# Patient Record
Sex: FEMALE | Race: BLACK OR AFRICAN AMERICAN | Employment: OTHER | URBAN - METROPOLITAN AREA
[De-identification: names, ages, dates, MRNs, and addresses within clinical notes are randomized per-mention and may not be internally consistent; named-entity substitution may affect disease eponyms.]

---

## 2017-02-10 ENCOUNTER — HOSPITAL ENCOUNTER (OUTPATIENT)
Dept: MAMMOGRAPHY | Age: 72
Discharge: HOME | End: 2017-02-10
Attending: INTERNAL MEDICINE | Admitting: INTERNAL MEDICINE

## 2017-02-10 DIAGNOSIS — Z12.31 VISIT FOR SCREENING MAMMOGRAM: ICD-10-CM

## 2017-03-15 ENCOUNTER — TELEPHONE (OUTPATIENT)
Dept: INTERNAL MEDICINE CLINIC | Age: 72
End: 2017-03-15

## 2017-03-16 ENCOUNTER — HOSPITAL ENCOUNTER (OUTPATIENT)
Dept: OTHER | Age: 72
Discharge: HOME | End: 2017-03-16
Attending: INTERNAL MEDICINE | Admitting: INTERNAL MEDICINE

## 2017-03-16 ENCOUNTER — OFFICE VISIT (OUTPATIENT)
Dept: INTERNAL MEDICINE CLINIC | Age: 72
End: 2017-03-16

## 2017-03-16 VITALS
SYSTOLIC BLOOD PRESSURE: 158 MMHG | BODY MASS INDEX: 40.02 KG/M2 | DIASTOLIC BLOOD PRESSURE: 92 MMHG | OXYGEN SATURATION: 12 % | WEIGHT: 255 LBS | HEIGHT: 67 IN

## 2017-03-16 DIAGNOSIS — J20.9 ACUTE BRONCHITIS, UNSPECIFIED ORGANISM: ICD-10-CM

## 2017-03-16 DIAGNOSIS — J45.901 RAD (REACTIVE AIRWAY DISEASE), UNSPECIFIED ASTHMA SEVERITY, WITH ACUTE EXACERBATION: ICD-10-CM

## 2017-03-16 DIAGNOSIS — J40 BRONCHITIS: ICD-10-CM

## 2017-03-16 DIAGNOSIS — J45.901 RAD (REACTIVE AIRWAY DISEASE), UNSPECIFIED ASTHMA SEVERITY, WITH ACUTE EXACERBATION: Primary | ICD-10-CM

## 2017-03-16 PROCEDURE — 3288F FALL RISK ASSESSMENT DOCD: CPT | Performed by: INTERNAL MEDICINE

## 2017-03-16 PROCEDURE — 99213 OFFICE O/P EST LOW 20 MIN: CPT | Performed by: INTERNAL MEDICINE

## 2017-03-16 RX ORDER — HYDROCODONE POLISTIREX AND CHLORPHENIRAMINE POLISTIREX 10; 8 MG/5ML; MG/5ML
5 SUSPENSION, EXTENDED RELEASE ORAL NIGHTLY PRN
Qty: 60 ML | Refills: 0 | Status: SHIPPED | OUTPATIENT
Start: 2017-03-16 | End: 2017-05-25 | Stop reason: ALTCHOICE

## 2017-03-16 RX ORDER — METHYLPREDNISOLONE 4 MG/1
TABLET ORAL
Qty: 1 KIT | Refills: 0 | Status: SHIPPED | OUTPATIENT
Start: 2017-03-16 | End: 2017-03-22

## 2017-03-16 RX ORDER — GUAIFENESIN 600 MG/1
600 TABLET, EXTENDED RELEASE ORAL 2 TIMES DAILY
Qty: 30 TABLET | Refills: 0 | Status: SHIPPED | OUTPATIENT
Start: 2017-03-16 | End: 2017-07-11 | Stop reason: ALTCHOICE

## 2017-03-16 RX ORDER — LEVOFLOXACIN 500 MG/1
500 TABLET, FILM COATED ORAL DAILY
Qty: 10 TABLET | Refills: 0 | Status: SHIPPED | OUTPATIENT
Start: 2017-03-16 | End: 2017-03-26

## 2017-03-16 ASSESSMENT — ENCOUNTER SYMPTOMS
SINUS PRESSURE: 1
CHEST TIGHTNESS: 0
RHINORRHEA: 1
HEMOPTYSIS: 0
NAUSEA: 0
VOICE CHANGE: 0
STRIDOR: 0
SHORTNESS OF BREATH: 0
ABDOMINAL PAIN: 0
EYE REDNESS: 0
WHEEZING: 1
VOMITING: 0
TROUBLE SWALLOWING: 0
COUGH: 1
SORE THROAT: 0

## 2017-03-20 RX ORDER — FAMOTIDINE 20 MG/1
TABLET, FILM COATED ORAL
Qty: 90 TABLET | Refills: 1 | Status: SHIPPED | OUTPATIENT
Start: 2017-03-20 | End: 2017-09-16 | Stop reason: SDUPTHER

## 2017-05-23 DIAGNOSIS — K21.9 GASTROESOPHAGEAL REFLUX DISEASE WITHOUT ESOPHAGITIS: Chronic | ICD-10-CM

## 2017-05-23 DIAGNOSIS — I10 ESSENTIAL HYPERTENSION, BENIGN: ICD-10-CM

## 2017-05-23 RX ORDER — OMEPRAZOLE 20 MG/1
CAPSULE, DELAYED RELEASE ORAL
Qty: 90 CAPSULE | Refills: 2 | Status: SHIPPED | OUTPATIENT
Start: 2017-05-23 | End: 2018-02-17 | Stop reason: SDUPTHER

## 2017-05-23 RX ORDER — ATORVASTATIN CALCIUM 20 MG/1
TABLET, FILM COATED ORAL
Qty: 90 TABLET | Refills: 2 | Status: SHIPPED | OUTPATIENT
Start: 2017-05-23 | End: 2018-02-17 | Stop reason: SDUPTHER

## 2017-05-23 RX ORDER — LOSARTAN POTASSIUM 50 MG/1
TABLET ORAL
Qty: 90 TABLET | Refills: 2 | Status: SHIPPED | OUTPATIENT
Start: 2017-05-23 | End: 2018-02-17 | Stop reason: SDUPTHER

## 2017-05-25 ENCOUNTER — OFFICE VISIT (OUTPATIENT)
Dept: INTERNAL MEDICINE CLINIC | Age: 72
End: 2017-05-25

## 2017-05-25 VITALS
DIASTOLIC BLOOD PRESSURE: 72 MMHG | SYSTOLIC BLOOD PRESSURE: 112 MMHG | WEIGHT: 253 LBS | BODY MASS INDEX: 39.63 KG/M2 | HEART RATE: 60 BPM

## 2017-05-25 DIAGNOSIS — F33.0 MILD EPISODE OF RECURRENT MAJOR DEPRESSIVE DISORDER (HCC): ICD-10-CM

## 2017-05-25 DIAGNOSIS — J45.20 EXTRINSIC ASTHMA, MILD INTERMITTENT, UNCOMPLICATED: ICD-10-CM

## 2017-05-25 DIAGNOSIS — I10 ESSENTIAL HYPERTENSION, BENIGN: Primary | ICD-10-CM

## 2017-05-25 DIAGNOSIS — K21.9 GASTROESOPHAGEAL REFLUX DISEASE WITHOUT ESOPHAGITIS: Chronic | ICD-10-CM

## 2017-05-25 PROCEDURE — 96372 THER/PROPH/DIAG INJ SC/IM: CPT | Performed by: INTERNAL MEDICINE

## 2017-05-25 PROCEDURE — 99213 OFFICE O/P EST LOW 20 MIN: CPT | Performed by: INTERNAL MEDICINE

## 2017-05-25 RX ORDER — METHYLPREDNISOLONE ACETATE 80 MG/ML
80 INJECTION, SUSPENSION INTRA-ARTICULAR; INTRALESIONAL; INTRAMUSCULAR; SOFT TISSUE ONCE
Qty: 1 ML | Refills: 0
Start: 2017-05-25 | End: 2017-05-25 | Stop reason: SDUPTHER

## 2017-05-25 RX ORDER — METHYLPREDNISOLONE ACETATE 80 MG/ML
80 INJECTION, SUSPENSION INTRA-ARTICULAR; INTRALESIONAL; INTRAMUSCULAR; SOFT TISSUE ONCE
Status: COMPLETED | OUTPATIENT
Start: 2017-05-25 | End: 2017-05-25

## 2017-05-25 RX ORDER — HYDROCODONE POLISTIREX AND CHLORPHENIRAMINE POLISTIREX 10; 8 MG/5ML; MG/5ML
5 SUSPENSION, EXTENDED RELEASE ORAL NIGHTLY PRN
Qty: 60 ML | Refills: 0 | Status: SHIPPED | OUTPATIENT
Start: 2017-05-25 | End: 2017-07-11 | Stop reason: ALTCHOICE

## 2017-05-25 RX ADMIN — METHYLPREDNISOLONE ACETATE 80 MG: 80 INJECTION, SUSPENSION INTRA-ARTICULAR; INTRALESIONAL; INTRAMUSCULAR; SOFT TISSUE at 11:16

## 2017-07-11 ENCOUNTER — OFFICE VISIT (OUTPATIENT)
Dept: INTERNAL MEDICINE CLINIC | Age: 72
End: 2017-07-11

## 2017-07-11 VITALS
SYSTOLIC BLOOD PRESSURE: 128 MMHG | HEIGHT: 67 IN | DIASTOLIC BLOOD PRESSURE: 80 MMHG | HEART RATE: 60 BPM | WEIGHT: 260.8 LBS | BODY MASS INDEX: 40.93 KG/M2

## 2017-07-11 DIAGNOSIS — I10 ESSENTIAL HYPERTENSION, BENIGN: ICD-10-CM

## 2017-07-11 DIAGNOSIS — Z01.818 PRE-OP EXAM: ICD-10-CM

## 2017-07-11 DIAGNOSIS — Z01.818 PRE-OP EXAM: Primary | ICD-10-CM

## 2017-07-11 LAB
HCT VFR BLD CALC: 41.7 % (ref 36–48)
HEMOGLOBIN: 13.6 G/DL (ref 12–16)
MCH RBC QN AUTO: 30.1 PG (ref 26–34)
MCHC RBC AUTO-ENTMCNC: 32.7 G/DL (ref 31–36)
MCV RBC AUTO: 92 FL (ref 80–100)
PDW BLD-RTO: 15.3 % (ref 12.4–15.4)
PLATELET # BLD: 289 K/UL (ref 135–450)
PMV BLD AUTO: 7.8 FL (ref 5–10.5)
RBC # BLD: 4.53 M/UL (ref 4–5.2)
WBC # BLD: 5.4 K/UL (ref 4–11)

## 2017-07-11 PROCEDURE — 93000 ELECTROCARDIOGRAM COMPLETE: CPT | Performed by: INTERNAL MEDICINE

## 2017-07-11 PROCEDURE — 99214 OFFICE O/P EST MOD 30 MIN: CPT | Performed by: INTERNAL MEDICINE

## 2017-07-12 LAB
ANION GAP SERPL CALCULATED.3IONS-SCNC: 12 MMOL/L (ref 3–16)
BUN BLDV-MCNC: 15 MG/DL (ref 7–20)
CALCIUM SERPL-MCNC: 9.8 MG/DL (ref 8.3–10.6)
CHLORIDE BLD-SCNC: 105 MMOL/L (ref 99–110)
CO2: 26 MMOL/L (ref 21–32)
CREAT SERPL-MCNC: 0.7 MG/DL (ref 0.6–1.2)
GFR AFRICAN AMERICAN: >60
GFR NON-AFRICAN AMERICAN: >60
GLUCOSE BLD-MCNC: 93 MG/DL (ref 70–99)
POTASSIUM SERPL-SCNC: 4.6 MMOL/L (ref 3.5–5.1)
SODIUM BLD-SCNC: 143 MMOL/L (ref 136–145)

## 2017-09-18 RX ORDER — FAMOTIDINE 20 MG/1
TABLET, FILM COATED ORAL
Qty: 90 TABLET | Refills: 1 | Status: SHIPPED | OUTPATIENT
Start: 2017-09-18 | End: 2018-03-17 | Stop reason: SDUPTHER

## 2017-11-30 ENCOUNTER — OFFICE VISIT (OUTPATIENT)
Dept: INTERNAL MEDICINE CLINIC | Age: 72
End: 2017-11-30

## 2017-11-30 VITALS
SYSTOLIC BLOOD PRESSURE: 138 MMHG | BODY MASS INDEX: 42.53 KG/M2 | WEIGHT: 271 LBS | HEART RATE: 64 BPM | DIASTOLIC BLOOD PRESSURE: 84 MMHG | HEIGHT: 67 IN

## 2017-11-30 DIAGNOSIS — F33.0 MILD EPISODE OF RECURRENT MAJOR DEPRESSIVE DISORDER (HCC): ICD-10-CM

## 2017-11-30 DIAGNOSIS — E78.2 MIXED HYPERLIPIDEMIA: Chronic | ICD-10-CM

## 2017-11-30 DIAGNOSIS — Z78.0 POST-MENOPAUSAL: ICD-10-CM

## 2017-11-30 DIAGNOSIS — E66.01 MORBID OBESITY (HCC): Chronic | ICD-10-CM

## 2017-11-30 DIAGNOSIS — I10 ESSENTIAL HYPERTENSION, BENIGN: Primary | ICD-10-CM

## 2017-11-30 DIAGNOSIS — Z23 NEED FOR INFLUENZA VACCINATION: ICD-10-CM

## 2017-11-30 DIAGNOSIS — J45.20 MILD INTERMITTENT EXTRINSIC ASTHMA WITHOUT COMPLICATION: ICD-10-CM

## 2017-11-30 PROCEDURE — G0008 ADMIN INFLUENZA VIRUS VAC: HCPCS | Performed by: INTERNAL MEDICINE

## 2017-11-30 PROCEDURE — 90662 IIV NO PRSV INCREASED AG IM: CPT | Performed by: INTERNAL MEDICINE

## 2017-11-30 PROCEDURE — 99214 OFFICE O/P EST MOD 30 MIN: CPT | Performed by: INTERNAL MEDICINE

## 2017-11-30 RX ORDER — MOMETASONE FUROATE 50 UG/1
2 SPRAY, METERED NASAL DAILY
Qty: 1 INHALER | Refills: 3 | Status: SHIPPED | OUTPATIENT
Start: 2017-11-30 | End: 2019-03-29 | Stop reason: SDUPTHER

## 2017-11-30 ASSESSMENT — ENCOUNTER SYMPTOMS: SHORTNESS OF BREATH: 0

## 2018-05-14 ENCOUNTER — OFFICE VISIT (OUTPATIENT)
Dept: INTERNAL MEDICINE CLINIC | Age: 73
End: 2018-05-14

## 2018-05-14 VITALS
WEIGHT: 268 LBS | SYSTOLIC BLOOD PRESSURE: 136 MMHG | BODY MASS INDEX: 41.97 KG/M2 | DIASTOLIC BLOOD PRESSURE: 86 MMHG | HEART RATE: 60 BPM

## 2018-05-14 DIAGNOSIS — E66.01 MORBID OBESITY (HCC): Chronic | ICD-10-CM

## 2018-05-14 DIAGNOSIS — F33.0 MILD EPISODE OF RECURRENT MAJOR DEPRESSIVE DISORDER (HCC): ICD-10-CM

## 2018-05-14 DIAGNOSIS — J01.00 ACUTE NON-RECURRENT MAXILLARY SINUSITIS: ICD-10-CM

## 2018-05-14 DIAGNOSIS — J45.20 MILD INTERMITTENT EXTRINSIC ASTHMA WITHOUT COMPLICATION: Primary | ICD-10-CM

## 2018-05-14 PROCEDURE — 99213 OFFICE O/P EST LOW 20 MIN: CPT | Performed by: INTERNAL MEDICINE

## 2018-05-14 RX ORDER — MONTELUKAST SODIUM 10 MG/1
10 TABLET ORAL DAILY
Qty: 30 TABLET | Refills: 3 | Status: SHIPPED | OUTPATIENT
Start: 2018-05-14 | End: 2018-07-02 | Stop reason: SDUPTHER

## 2018-05-14 RX ORDER — BENZONATATE 200 MG/1
200 CAPSULE ORAL 3 TIMES DAILY PRN
Qty: 21 CAPSULE | Refills: 0 | Status: SHIPPED | OUTPATIENT
Start: 2018-05-14 | End: 2018-05-21

## 2018-05-14 RX ORDER — PREDNISONE 20 MG/1
20 TABLET ORAL DAILY
Qty: 5 TABLET | Refills: 0 | Status: SHIPPED | OUTPATIENT
Start: 2018-05-14 | End: 2018-05-19

## 2018-05-14 RX ORDER — AZITHROMYCIN 250 MG/1
TABLET, FILM COATED ORAL
Qty: 1 PACKET | Refills: 0 | Status: SHIPPED | OUTPATIENT
Start: 2018-05-14 | End: 2018-07-02 | Stop reason: ALTCHOICE

## 2018-05-14 ASSESSMENT — ENCOUNTER SYMPTOMS
COUGH: 1
SHORTNESS OF BREATH: 1
WHEEZING: 1

## 2018-07-02 ENCOUNTER — OFFICE VISIT (OUTPATIENT)
Dept: INTERNAL MEDICINE CLINIC | Age: 73
End: 2018-07-02

## 2018-07-02 VITALS
HEART RATE: 64 BPM | DIASTOLIC BLOOD PRESSURE: 80 MMHG | BODY MASS INDEX: 42.13 KG/M2 | SYSTOLIC BLOOD PRESSURE: 132 MMHG | WEIGHT: 269 LBS

## 2018-07-02 DIAGNOSIS — F33.0 MILD EPISODE OF RECURRENT MAJOR DEPRESSIVE DISORDER (HCC): ICD-10-CM

## 2018-07-02 DIAGNOSIS — E66.01 MORBID OBESITY (HCC): Chronic | ICD-10-CM

## 2018-07-02 DIAGNOSIS — K21.9 GASTROESOPHAGEAL REFLUX DISEASE WITHOUT ESOPHAGITIS: Chronic | ICD-10-CM

## 2018-07-02 DIAGNOSIS — E78.2 MIXED HYPERLIPIDEMIA: Chronic | ICD-10-CM

## 2018-07-02 DIAGNOSIS — J45.20 MILD INTERMITTENT EXTRINSIC ASTHMA WITHOUT COMPLICATION: Primary | ICD-10-CM

## 2018-07-02 DIAGNOSIS — I10 ESSENTIAL HYPERTENSION, BENIGN: ICD-10-CM

## 2018-07-02 LAB
A/G RATIO: 1.7 (ref 1.1–2.2)
ALBUMIN SERPL-MCNC: 4.5 G/DL (ref 3.4–5)
ALP BLD-CCNC: 68 U/L (ref 40–129)
ALT SERPL-CCNC: 17 U/L (ref 10–40)
ANION GAP SERPL CALCULATED.3IONS-SCNC: 12 MMOL/L (ref 3–16)
AST SERPL-CCNC: 16 U/L (ref 15–37)
BASOPHILS ABSOLUTE: 0 K/UL (ref 0–0.2)
BASOPHILS RELATIVE PERCENT: 0.6 %
BILIRUB SERPL-MCNC: 0.9 MG/DL (ref 0–1)
BUN BLDV-MCNC: 20 MG/DL (ref 7–20)
CALCIUM SERPL-MCNC: 10 MG/DL (ref 8.3–10.6)
CHLORIDE BLD-SCNC: 102 MMOL/L (ref 99–110)
CHOLESTEROL, TOTAL: 178 MG/DL (ref 0–199)
CO2: 27 MMOL/L (ref 21–32)
CREAT SERPL-MCNC: 0.9 MG/DL (ref 0.6–1.2)
EOSINOPHILS ABSOLUTE: 0.1 K/UL (ref 0–0.6)
EOSINOPHILS RELATIVE PERCENT: 1.6 %
GFR AFRICAN AMERICAN: >60
GFR NON-AFRICAN AMERICAN: >60
GLOBULIN: 2.7 G/DL
GLUCOSE BLD-MCNC: 84 MG/DL (ref 70–99)
HCT VFR BLD CALC: 40.9 % (ref 36–48)
HDLC SERPL-MCNC: 100 MG/DL (ref 40–60)
HEMOGLOBIN: 13.8 G/DL (ref 12–16)
LDL CHOLESTEROL CALCULATED: 66 MG/DL
LYMPHOCYTES ABSOLUTE: 2.9 K/UL (ref 1–5.1)
LYMPHOCYTES RELATIVE PERCENT: 59.8 %
MCH RBC QN AUTO: 30.1 PG (ref 26–34)
MCHC RBC AUTO-ENTMCNC: 33.6 G/DL (ref 31–36)
MCV RBC AUTO: 89.4 FL (ref 80–100)
MONOCYTES ABSOLUTE: 0.4 K/UL (ref 0–1.3)
MONOCYTES RELATIVE PERCENT: 8.4 %
NEUTROPHILS ABSOLUTE: 1.4 K/UL (ref 1.7–7.7)
NEUTROPHILS RELATIVE PERCENT: 29.6 %
PDW BLD-RTO: 15.3 % (ref 12.4–15.4)
PLATELET # BLD: 300 K/UL (ref 135–450)
PMV BLD AUTO: 7.7 FL (ref 5–10.5)
POTASSIUM SERPL-SCNC: 3.9 MMOL/L (ref 3.5–5.1)
RBC # BLD: 4.58 M/UL (ref 4–5.2)
SODIUM BLD-SCNC: 141 MMOL/L (ref 136–145)
T4 FREE: 1.1 NG/DL (ref 0.9–1.8)
TOTAL PROTEIN: 7.2 G/DL (ref 6.4–8.2)
TRIGL SERPL-MCNC: 59 MG/DL (ref 0–150)
TSH SERPL DL<=0.05 MIU/L-ACNC: 2.92 UIU/ML (ref 0.27–4.2)
VLDLC SERPL CALC-MCNC: 12 MG/DL
WBC # BLD: 4.9 K/UL (ref 4–11)

## 2018-07-02 PROCEDURE — 99214 OFFICE O/P EST MOD 30 MIN: CPT | Performed by: INTERNAL MEDICINE

## 2018-07-02 PROCEDURE — G8510 SCR DEP NEG, NO PLAN REQD: HCPCS | Performed by: INTERNAL MEDICINE

## 2018-07-02 PROCEDURE — 3288F FALL RISK ASSESSMENT DOCD: CPT | Performed by: INTERNAL MEDICINE

## 2018-07-02 RX ORDER — MONTELUKAST SODIUM 10 MG/1
10 TABLET ORAL DAILY
Qty: 90 TABLET | Refills: 3 | Status: SHIPPED | OUTPATIENT
Start: 2018-07-02 | End: 2019-06-11 | Stop reason: SDUPTHER

## 2018-07-02 ASSESSMENT — PATIENT HEALTH QUESTIONNAIRE - PHQ9
SUM OF ALL RESPONSES TO PHQ QUESTIONS 1-9: 0
SUM OF ALL RESPONSES TO PHQ9 QUESTIONS 1 & 2: 0
2. FEELING DOWN, DEPRESSED OR HOPELESS: 0
1. LITTLE INTEREST OR PLEASURE IN DOING THINGS: 0

## 2018-07-02 NOTE — PROGRESS NOTES
reviewed. Current Outpatient Prescriptions:     escitalopram (LEXAPRO) 10 MG tablet, TAKE 1 TABLET DAILY, Disp: 90 tablet, Rfl: 1    montelukast (SINGULAIR) 10 MG tablet, Take 1 tablet by mouth daily, Disp: 30 tablet, Rfl: 3    triamterene-hydrochlorothiazide (MAXZIDE-25) 37.5-25 MG per tablet, TAKE 1 TABLET DAILY, Disp: 90 tablet, Rfl: 3    amLODIPine (NORVASC) 10 MG tablet, TAKE 1 TABLET DAILY, Disp: 90 tablet, Rfl: 3    famotidine (PEPCID) 20 MG tablet, TAKE 1 TABLET NIGHTLY AS NEEDED, Disp: 90 tablet, Rfl: 1    omeprazole (PRILOSEC) 20 MG delayed release capsule, TAKE 1 CAPSULE DAILY, Disp: 90 capsule, Rfl: 2    losartan (COZAAR) 50 MG tablet, TAKE 1 TABLET DAILY, Disp: 90 tablet, Rfl: 2    atorvastatin (LIPITOR) 20 MG tablet, TAKE 1 TABLET DAILY, Disp: 90 tablet, Rfl: 2    PROAIR  (90 Base) MCG/ACT inhaler, USE 2 INHALATIONS EVERY 6 HOURS AS NEEDED FOR WHEEZING, Disp: 25.5 g, Rfl: 3    mometasone (NASONEX) 50 MCG/ACT nasal spray, 2 sprays by Nasal route daily, Disp: 1 Inhaler, Rfl: 3    Cholecalciferol (VITAMIN D) 2000 UNITS CAPS capsule, Take by mouth, Disp: , Rfl:     dorzolamide (TRUSOPT) 2 % ophthalmic solution, Place 2 drops into both eyes 2 times daily, Disp: 10 mL, Rfl: 11    aspirin 325 MG tablet, Take 325 mg by mouth daily. , Disp: , Rfl:     B Complex-C-Folic Acid (VITABEE/C PO), Take 1 tablet by mouth.  , Disp: , Rfl:     FISH OIL, Take 1,000 mg by mouth daily. , Disp: , Rfl:     Calcium Carb-Cholecalciferol (CALCIUM 500 +D) 500-400 MG-UNIT TABS, Take 2 tablets by mouth daily. , Disp: , Rfl:     Assessment/Plan:  Mariella Espinoza was seen today for hypertension and hyperlipidemia. Diagnoses and all orders for this visit:    Mild intermittent extrinsic asthma without complication  -     montelukast (SINGULAIR) 10 MG tablet; Take 1 tablet by mouth daily    Essential hypertension, benign  -     CBC Auto Differential; Future  -     Lipid Panel;  Future  -     TSH without Reflex;

## 2018-07-03 ENCOUNTER — TELEPHONE (OUTPATIENT)
Dept: INTERNAL MEDICINE CLINIC | Age: 73
End: 2018-07-03

## 2018-07-03 NOTE — TELEPHONE ENCOUNTER
Pt calling to get her blood work  Results----having trouble getting on MyChart gave her the number for them but still would like a call back. Thanks.

## 2018-09-13 RX ORDER — FAMOTIDINE 20 MG/1
TABLET, FILM COATED ORAL
Qty: 90 TABLET | Refills: 1 | Status: SHIPPED | OUTPATIENT
Start: 2018-09-13 | End: 2019-03-12 | Stop reason: SDUPTHER

## 2018-11-14 DIAGNOSIS — K21.9 GASTROESOPHAGEAL REFLUX DISEASE WITHOUT ESOPHAGITIS: Chronic | ICD-10-CM

## 2018-11-14 DIAGNOSIS — I10 ESSENTIAL HYPERTENSION, BENIGN: ICD-10-CM

## 2018-11-14 RX ORDER — LOSARTAN POTASSIUM 50 MG/1
TABLET ORAL
Qty: 90 TABLET | Refills: 3 | Status: SHIPPED | OUTPATIENT
Start: 2018-11-14 | End: 2019-03-26

## 2018-11-14 RX ORDER — ATORVASTATIN CALCIUM 20 MG/1
TABLET, FILM COATED ORAL
Qty: 90 TABLET | Refills: 3 | Status: SHIPPED | OUTPATIENT
Start: 2018-11-14 | End: 2019-07-23 | Stop reason: SDUPTHER

## 2018-11-14 RX ORDER — OMEPRAZOLE 20 MG/1
CAPSULE, DELAYED RELEASE ORAL
Qty: 90 CAPSULE | Refills: 3 | Status: SHIPPED | OUTPATIENT
Start: 2018-11-14 | End: 2019-11-10 | Stop reason: SDUPTHER

## 2018-11-25 DIAGNOSIS — F33.0 MILD EPISODE OF RECURRENT MAJOR DEPRESSIVE DISORDER (HCC): ICD-10-CM

## 2018-11-26 RX ORDER — ESCITALOPRAM OXALATE 10 MG/1
TABLET ORAL
Qty: 90 TABLET | Refills: 1 | Status: SHIPPED | OUTPATIENT
Start: 2018-11-26 | End: 2019-05-28 | Stop reason: SDUPTHER

## 2018-12-05 ENCOUNTER — HOSPITAL ENCOUNTER (EMERGENCY)
Age: 73
Discharge: HOME | End: 2018-12-05
Attending: EMERGENCY MEDICINE
Payer: MEDICARE

## 2018-12-05 VITALS
SYSTOLIC BLOOD PRESSURE: 141 MMHG | TEMPERATURE: 98.5 F | OXYGEN SATURATION: 100 % | DIASTOLIC BLOOD PRESSURE: 65 MMHG | RESPIRATION RATE: 17 BRPM | HEART RATE: 56 BPM

## 2018-12-05 DIAGNOSIS — E86.0 DEHYDRATION: Primary | ICD-10-CM

## 2018-12-05 LAB
ANION GAP SERPL CALCULATED.3IONS-SCNC: 12 MMOL/L (ref 3–16)
BASOPHILS ABSOLUTE: 0 K/UL (ref 0–0.2)
BASOPHILS RELATIVE PERCENT: 0.6 %
BUN BLDV-MCNC: 24 MG/DL (ref 7–20)
CALCIUM SERPL-MCNC: 9.7 MG/DL (ref 8.3–10.6)
CHLORIDE BLD-SCNC: 99 MMOL/L (ref 99–110)
CO2: 25 MMOL/L (ref 21–32)
CREAT SERPL-MCNC: 1 MG/DL (ref 0.6–1.2)
EKG ATRIAL RATE: 50 BPM
EKG DIAGNOSIS: NORMAL
EKG P AXIS: 43 DEGREES
EKG P-R INTERVAL: 208 MS
EKG Q-T INTERVAL: 440 MS
EKG QRS DURATION: 104 MS
EKG QTC CALCULATION (BAZETT): 401 MS
EKG R AXIS: 53 DEGREES
EKG T AXIS: 10 DEGREES
EKG VENTRICULAR RATE: 50 BPM
EOSINOPHILS ABSOLUTE: 0.1 K/UL (ref 0–0.6)
EOSINOPHILS RELATIVE PERCENT: 0.9 %
GFR AFRICAN AMERICAN: >60
GFR NON-AFRICAN AMERICAN: 54
GLUCOSE BLD-MCNC: 93 MG/DL (ref 70–99)
HCT VFR BLD CALC: 39.6 % (ref 36–48)
HEMOGLOBIN: 12.8 G/DL (ref 12–16)
LYMPHOCYTES ABSOLUTE: 3.5 K/UL (ref 1–5.1)
LYMPHOCYTES RELATIVE PERCENT: 53.9 %
MAGNESIUM: 1.9 MG/DL (ref 1.8–2.4)
MCH RBC QN AUTO: 29.3 PG (ref 26–34)
MCHC RBC AUTO-ENTMCNC: 32.3 G/DL (ref 31–36)
MCV RBC AUTO: 90.9 FL (ref 80–100)
MONOCYTES ABSOLUTE: 0.5 K/UL (ref 0–1.3)
MONOCYTES RELATIVE PERCENT: 7.8 %
NEUTROPHILS ABSOLUTE: 2.4 K/UL (ref 1.7–7.7)
NEUTROPHILS RELATIVE PERCENT: 36.8 %
PDW BLD-RTO: 14.9 % (ref 12.4–15.4)
PLATELET # BLD: 282 K/UL (ref 135–450)
PMV BLD AUTO: 7.5 FL (ref 5–10.5)
POTASSIUM SERPL-SCNC: 4.3 MMOL/L (ref 3.5–5.1)
RBC # BLD: 4.36 M/UL (ref 4–5.2)
SODIUM BLD-SCNC: 136 MMOL/L (ref 136–145)
TROPONIN: <0.01 NG/ML
WBC # BLD: 6.5 K/UL (ref 4–11)

## 2018-12-05 PROCEDURE — 96360 HYDRATION IV INFUSION INIT: CPT

## 2018-12-05 PROCEDURE — 99284 EMERGENCY DEPT VISIT MOD MDM: CPT

## 2018-12-05 PROCEDURE — 93005 ELECTROCARDIOGRAM TRACING: CPT | Performed by: EMERGENCY MEDICINE

## 2018-12-05 PROCEDURE — 84484 ASSAY OF TROPONIN QUANT: CPT

## 2018-12-05 PROCEDURE — 2580000003 HC RX 258: Performed by: EMERGENCY MEDICINE

## 2018-12-05 PROCEDURE — 80048 BASIC METABOLIC PNL TOTAL CA: CPT

## 2018-12-05 PROCEDURE — 83735 ASSAY OF MAGNESIUM: CPT

## 2018-12-05 PROCEDURE — 85025 COMPLETE CBC W/AUTO DIFF WBC: CPT

## 2018-12-05 RX ORDER — 0.9 % SODIUM CHLORIDE 0.9 %
1000 INTRAVENOUS SOLUTION INTRAVENOUS ONCE
Status: COMPLETED | OUTPATIENT
Start: 2018-12-05 | End: 2018-12-05

## 2018-12-05 RX ADMIN — SODIUM CHLORIDE 1000 ML: 9 INJECTION, SOLUTION INTRAVENOUS at 18:59

## 2018-12-05 ASSESSMENT — PAIN SCALES - GENERAL: PAINLEVEL_OUTOF10: 0

## 2018-12-05 ASSESSMENT — ENCOUNTER SYMPTOMS
EYES NEGATIVE: 1
COLOR CHANGE: 0
VOMITING: 0
WHEEZING: 0
DIARRHEA: 0
ABDOMINAL PAIN: 0
CHEST TIGHTNESS: 0
ABDOMINAL DISTENTION: 0
SHORTNESS OF BREATH: 0
COUGH: 0
SINUS PRESSURE: 0
NAUSEA: 0

## 2018-12-05 NOTE — ED PROVIDER NOTES
the IV fluids and it was determined that she is able to be safely discharge to home at this time. This patient was also evaluated by the attending physician. All care plans were discussed and agreed upon. Clinical Impression     1.  Dehydration        Disposition     PATIENT REFERRED TO:  Ramiro Brambila MD  [address] [place] [address]  [phone]    Schedule an appointment as soon as possible for a visit   As needed      DISCHARGE MEDICATIONS:  New Prescriptions    No medications on file       [address] [facility], [facility]  Resident  12/05/18 [id number]

## 2019-03-26 ENCOUNTER — TELEPHONE (OUTPATIENT)
Dept: INTERNAL MEDICINE CLINIC | Age: 74
End: 2019-03-26

## 2019-03-26 RX ORDER — LISINOPRIL 10 MG/1
10 TABLET ORAL DAILY
Qty: 90 TABLET | Refills: 1 | Status: SHIPPED | OUTPATIENT
Start: 2019-03-26 | End: 2019-03-29 | Stop reason: SDUPTHER

## 2019-03-29 DIAGNOSIS — J45.20 MILD INTERMITTENT EXTRINSIC ASTHMA WITHOUT COMPLICATION: ICD-10-CM

## 2019-03-29 RX ORDER — MOMETASONE FUROATE 50 UG/1
2 SPRAY, METERED NASAL DAILY
Qty: 1 INHALER | Refills: 3 | Status: SHIPPED | OUTPATIENT
Start: 2019-03-29 | End: 2019-07-23 | Stop reason: SDUPTHER

## 2019-03-29 RX ORDER — LISINOPRIL 10 MG/1
10 TABLET ORAL DAILY
Qty: 7 TABLET | Refills: 0 | Status: SHIPPED | OUTPATIENT
Start: 2019-03-29 | End: 2019-07-23 | Stop reason: SDUPTHER

## 2019-04-05 ENCOUNTER — TELEPHONE (OUTPATIENT)
Dept: INTERNAL MEDICINE CLINIC | Age: 74
End: 2019-04-05

## 2019-04-11 ENCOUNTER — OFFICE VISIT (OUTPATIENT)
Dept: INTERNAL MEDICINE CLINIC | Age: 74
End: 2019-04-11
Payer: MEDICARE

## 2019-04-11 VITALS
DIASTOLIC BLOOD PRESSURE: 68 MMHG | SYSTOLIC BLOOD PRESSURE: 120 MMHG | WEIGHT: 265 LBS | TEMPERATURE: 98.1 F | BODY MASS INDEX: 41.5 KG/M2

## 2019-04-11 DIAGNOSIS — J30.2 SEASONAL ALLERGIES: Primary | ICD-10-CM

## 2019-04-11 PROCEDURE — 99213 OFFICE O/P EST LOW 20 MIN: CPT | Performed by: NURSE PRACTITIONER

## 2019-04-11 RX ORDER — CETIRIZINE HYDROCHLORIDE 10 MG/1
10 TABLET ORAL DAILY
Qty: 30 TABLET | Refills: 0 | Status: SHIPPED | OUTPATIENT
Start: 2019-04-11 | End: 2019-05-11

## 2019-04-11 ASSESSMENT — ENCOUNTER SYMPTOMS
ALLERGIC/IMMUNOLOGIC NEGATIVE: 1
COUGH: 1
EYES NEGATIVE: 1

## 2019-04-11 NOTE — PATIENT INSTRUCTIONS
Drink at least 5 bottles of water  Gargle with warm salt and water  Ricola lemon honeyor honey lime cough drops  Take allergy medicine at night

## 2019-04-11 NOTE — PROGRESS NOTES
Subjective:      Patient ID: Rosalina Rea is a 68 y.o. female. URI    This is a new problem. The current episode started 1 to 4 weeks ago. The problem has been gradually worsening. There has been no fever. Associated symptoms include coughing. She has tried increased fluids for the symptoms. The treatment provided mild relief. Review of Systems   Constitutional: Positive for fatigue. HENT: Negative. Eyes: Negative. Respiratory: Positive for cough. Endocrine: Negative. Genitourinary: Negative. Allergic/Immunologic: Negative. Neurological: Positive for dizziness. Hematological: Negative. Psychiatric/Behavioral: Negative. Vitals:    04/11/19 0842   BP: 120/68   Temp: 98.1 °F (36.7 °C)     BP Readings from Last 3 Encounters:   04/11/19 120/68   12/05/18 (!) 141/65   07/02/18 132/80     Wt Readings from Last 3 Encounters:   04/11/19 265 lb (120.2 kg)   07/02/18 269 lb (122 kg)   05/14/18 268 lb (121.6 kg)     Past Medical History:   Diagnosis Date    Asthma     Essential hypertension, benign 6/13/2011    Extrinsic asthma 6/13/2011    GERD (gastroesophageal reflux disease)     EGD DONE    Glaucoma (increased eye pressure) 2015    Hyperlipidemia     Mild episode of recurrent major depressive disorder ([facility] [address] 12/21/2016    Mixed hyperlipidemia 5/10/2016    Reactive airway disease      Family History   Problem Relation Age of Onset    Kidney Disease Mother     Diabetes Father     Coronary Art Dis Brother      Objective:   Physical Exam   Constitutional: She appears well-developed and well-nourished. HENT:   Head: Normocephalic. Right Ear: Hearing, tympanic membrane, external ear and ear canal normal.   Left Ear: Hearing, tympanic membrane, external ear and ear canal normal.   Nose: Nose normal. Right sinus exhibits no maxillary sinus tenderness and no frontal sinus tenderness. Left sinus exhibits no maxillary sinus tenderness and no frontal sinus tenderness. Mouth/Throat: Uvula is midline. Cardiovascular: Normal rate, regular rhythm and normal heart sounds. Pulmonary/Chest: Effort normal and breath sounds normal.   Lymphadenopathy:     She has no cervical adenopathy. Right cervical: No superficial cervical, no deep cervical and no posterior cervical adenopathy present. Left cervical: No superficial cervical, no deep cervical and no posterior cervical adenopathy present. Skin: Skin is warm and dry.        Assessment:      Seasonal allergies      Plan:     Drink at least 5 bottles of water  Gargle with warm salt and water  Ricola lemon honeyor honey lime cough drops  Take allergy medicine at night          Sanford Aguirre, APRN - CNP

## 2019-04-16 ENCOUNTER — TELEPHONE (OUTPATIENT)
Dept: INTERNAL MEDICINE CLINIC | Age: 74
End: 2019-04-16

## 2019-04-16 RX ORDER — BENZONATATE 200 MG/1
200 CAPSULE ORAL 3 TIMES DAILY PRN
Qty: 30 CAPSULE | Refills: 0 | Status: SHIPPED | OUTPATIENT
Start: 2019-04-16 | End: 2019-04-23

## 2019-04-16 NOTE — TELEPHONE ENCOUNTER
Patient called and stated that it is going on 4 weeks that she has had this cough and would like a prescription.      Patient Phone   [phone]

## 2019-05-03 ENCOUNTER — OFFICE VISIT (OUTPATIENT)
Dept: INTERNAL MEDICINE CLINIC | Age: 74
End: 2019-05-03
Payer: MEDICARE

## 2019-05-03 VITALS
HEART RATE: 64 BPM | DIASTOLIC BLOOD PRESSURE: 80 MMHG | BODY MASS INDEX: 42.06 KG/M2 | WEIGHT: 268 LBS | SYSTOLIC BLOOD PRESSURE: 126 MMHG | HEIGHT: 67 IN

## 2019-05-03 DIAGNOSIS — J20.9 ACUTE BRONCHITIS, UNSPECIFIED ORGANISM: Primary | ICD-10-CM

## 2019-05-03 DIAGNOSIS — J00 ACUTE RHINITIS: ICD-10-CM

## 2019-05-03 DIAGNOSIS — E66.01 MORBID OBESITY (HCC): Chronic | ICD-10-CM

## 2019-05-03 PROBLEM — J31.0 RHINITIS: Status: ACTIVE | Noted: 2019-05-03

## 2019-05-03 PROCEDURE — 99213 OFFICE O/P EST LOW 20 MIN: CPT | Performed by: INTERNAL MEDICINE

## 2019-05-03 PROCEDURE — 94640 AIRWAY INHALATION TREATMENT: CPT | Performed by: INTERNAL MEDICINE

## 2019-05-03 RX ORDER — ALBUTEROL SULFATE 2.5 MG/3ML
2.5 SOLUTION RESPIRATORY (INHALATION) ONCE
Qty: 25 VIAL | Refills: 2
Start: 2019-05-03 | End: 2019-07-23 | Stop reason: SDUPTHER

## 2019-05-03 RX ORDER — BROMPHENIRAMINE MALEATE, PSEUDOEPHEDRINE HYDROCHLORIDE, AND DEXTROMETHORPHAN HYDROBROMIDE 2; 30; 10 MG/5ML; MG/5ML; MG/5ML
5 SYRUP ORAL 4 TIMES DAILY PRN
Qty: 473 ML | Refills: 0 | COMMUNITY
Start: 2019-05-03 | End: 2019-05-17 | Stop reason: ALTCHOICE

## 2019-05-03 RX ORDER — ALBUTEROL SULFATE 2.5 MG/3ML
2.5 SOLUTION RESPIRATORY (INHALATION) ONCE
Status: COMPLETED | OUTPATIENT
Start: 2019-05-03 | End: 2019-05-03

## 2019-05-03 RX ORDER — PREDNISONE 10 MG/1
TABLET ORAL
Qty: 30 TABLET | Refills: 0 | Status: SHIPPED | OUTPATIENT
Start: 2019-05-03 | End: 2019-05-17 | Stop reason: ALTCHOICE

## 2019-05-03 RX ORDER — AZITHROMYCIN 250 MG/1
TABLET, FILM COATED ORAL
Qty: 1 PACKET | Refills: 0 | Status: SHIPPED | OUTPATIENT
Start: 2019-05-03 | End: 2019-05-17 | Stop reason: ALTCHOICE

## 2019-05-03 RX ADMIN — ALBUTEROL SULFATE 2.5 MG: 2.5 SOLUTION RESPIRATORY (INHALATION) at 11:05

## 2019-05-03 NOTE — PROGRESS NOTES
SUBJECTIVE:    Patient comes to the office complaining of cough, congestion, drainage that has been present for the last several days. Her coughis productive of yellow sputum. Patient does not have associated fever or chills. Patient denies gastrointestinal symptoms related to Her present condition. OBJECTIVE:  Vitals:    05/03/19 1027   BP: 126/80   Pulse: 64       Review of Systems    Physical Exam   Constitutional: She is oriented to person, place, and time. She appears well-developed and well-nourished. No distress. HENT:   Head: Normocephalic and atraumatic. Pulmonary/Chest: Effort normal. Stridor present. She has wheezes. She has rales. She exhibits no tenderness. Neurological: She is alert and oriented to person, place, and time. No cranial nerve deficit. Skin: She is not diaphoretic. Psychiatric: She has a normal mood and affect. Her behavior is normal. Judgment and thought content normal.   Vitals reviewed.     Vitals:    05/03/19 1027   BP: 126/80   Pulse: 64         Current Outpatient Medications:     amLODIPine (NORVASC) 10 MG tablet, TAKE 1 TABLET DAILY, Disp: 90 tablet, Rfl: 0    triamterene-hydrochlorothiazide (MAXZIDE-25) 37.5-25 MG per tablet, TAKE 1 TABLET DAILY, Disp: 90 tablet, Rfl: 0    cetirizine (ZYRTEC) 10 MG tablet, Take 1 tablet by mouth daily, Disp: 30 tablet, Rfl: 0    mometasone (NASONEX) 50 MCG/ACT nasal spray, 2 sprays by Nasal route daily, Disp: 1 Inhaler, Rfl: 3    lisinopril (PRINIVIL;ZESTRIL) 10 MG tablet, Take 1 tablet by mouth daily, Disp: 7 tablet, Rfl: 0    famotidine (PEPCID) 20 MG tablet, TAKE 1 TABLET NIGHTLY AS NEEDED, Disp: 90 tablet, Rfl: 0    PROAIR  (90 Base) MCG/ACT inhaler, USE 2 INHALATIONS EVERY 6 HOURS AS NEEDED FOR WHEEZING, Disp: 25.5 g, Rfl: 1    escitalopram (LEXAPRO) 10 MG tablet, TAKE 1 TABLET DAILY, Disp: 90 tablet, Rfl: 1    omeprazole (PRILOSEC) 20 MG delayed release capsule, TAKE 1 CAPSULE DAILY, Disp: 90 capsule, Rfl: 3    atorvastatin (LIPITOR) 20 MG tablet, TAKE 1 TABLET DAILY, Disp: 90 tablet, Rfl: 3    montelukast (SINGULAIR) 10 MG tablet, Take 1 tablet by mouth daily, Disp: 90 tablet, Rfl: 3    Cholecalciferol (VITAMIN D) 2000 UNITS CAPS capsule, Take by mouth, Disp: , Rfl:     dorzolamide (TRUSOPT) 2 % ophthalmic solution, Place 2 drops into both eyes 2 times daily, Disp: 10 mL, Rfl: 11    aspirin 325 MG tablet, Take 325 mg by mouth daily. , Disp: , Rfl:     B Complex-C-Folic Acid (VITABEE/C PO), Take 1 tablet by mouth.  , Disp: , Rfl:     FISH OIL, Take 1,000 mg by mouth daily. , Disp: , Rfl:     Calcium Carb-Cholecalciferol (CALCIUM 500 +D) 500-400 MG-UNIT TABS, Take 2 tablets by mouth daily. , Disp: , Rfl:     Assessment/Plan:  Sophie Pabon was seen today for cough, chest congestion, shortness of breath and wheezing. Diagnoses and all orders for this visit:    Acute bronchitis, unspecified organism  -     albuterol (PROVENTIL) (2.5 MG/3ML) 0.083% nebulizer solution; Take 3 mLs by nebulization once for 1 dose  -     predniSONE (DELTASONE) 10 MG tablet; Take 4 tablets daily for 3 days, then 3 tablets daily for 3 days, then 2 tablets daily for 3 days, then 1 tablet daily for 3 days. -     azithromycin (ZITHROMAX Z-LIAN) 250 MG tablet; Use as directred  -     brompheniramine-pseudoephedrine-DM (BROMFED DM) 2-30-10 MG/5ML syrup; Take 5 mLs by mouth 4 times daily as needed for Congestion or Cough    Acute rhinitis  -     azithromycin (ZITHROMAX Z-LIAN) 250 MG tablet; Use as directred    Morbid obesity ([address]  Comments:  Chronic, stable.     Other orders  -     albuterol (PROVENTIL) nebulizer solution 2.5 mg      Thelma Browne MD

## 2019-05-17 ENCOUNTER — OFFICE VISIT (OUTPATIENT)
Dept: INTERNAL MEDICINE CLINIC | Age: 74
End: 2019-05-17
Payer: MEDICARE

## 2019-05-17 VITALS
BODY MASS INDEX: 41.75 KG/M2 | HEIGHT: 67 IN | WEIGHT: 266 LBS | DIASTOLIC BLOOD PRESSURE: 68 MMHG | SYSTOLIC BLOOD PRESSURE: 112 MMHG | HEART RATE: 60 BPM

## 2019-05-17 DIAGNOSIS — I10 ESSENTIAL HYPERTENSION, BENIGN: ICD-10-CM

## 2019-05-17 DIAGNOSIS — E66.01 MORBID OBESITY (HCC): Primary | Chronic | ICD-10-CM

## 2019-05-17 DIAGNOSIS — E66.01 MORBID OBESITY (HCC): Chronic | ICD-10-CM

## 2019-05-17 DIAGNOSIS — Z13.1 DIABETES MELLITUS SCREENING: ICD-10-CM

## 2019-05-17 DIAGNOSIS — Z12.39 BREAST CANCER SCREENING: ICD-10-CM

## 2019-05-17 LAB
A/G RATIO: 1.4 (ref 1.1–2.2)
ALBUMIN SERPL-MCNC: 3.9 G/DL (ref 3.4–5)
ALP BLD-CCNC: 71 U/L (ref 40–129)
ALT SERPL-CCNC: 13 U/L (ref 10–40)
ANION GAP SERPL CALCULATED.3IONS-SCNC: 13 MMOL/L (ref 3–16)
AST SERPL-CCNC: 11 U/L (ref 15–37)
BASOPHILS ABSOLUTE: 0.1 K/UL (ref 0–0.2)
BASOPHILS RELATIVE PERCENT: 0.7 %
BILIRUB SERPL-MCNC: 1 MG/DL (ref 0–1)
BUN BLDV-MCNC: 22 MG/DL (ref 7–20)
CALCIUM SERPL-MCNC: 10 MG/DL (ref 8.3–10.6)
CHLORIDE BLD-SCNC: 99 MMOL/L (ref 99–110)
CHOLESTEROL, TOTAL: 150 MG/DL (ref 0–199)
CO2: 26 MMOL/L (ref 21–32)
CREAT SERPL-MCNC: 0.9 MG/DL (ref 0.6–1.2)
EOSINOPHILS ABSOLUTE: 0.1 K/UL (ref 0–0.6)
EOSINOPHILS RELATIVE PERCENT: 1.2 %
GFR AFRICAN AMERICAN: >60
GFR NON-AFRICAN AMERICAN: >60
GLOBULIN: 2.7 G/DL
GLUCOSE BLD-MCNC: 96 MG/DL (ref 70–99)
HCT VFR BLD CALC: 40.5 % (ref 36–48)
HDLC SERPL-MCNC: 79 MG/DL (ref 40–60)
HEMOGLOBIN: 13.1 G/DL (ref 12–16)
LDL CHOLESTEROL CALCULATED: 58 MG/DL
LYMPHOCYTES ABSOLUTE: 4.1 K/UL (ref 1–5.1)
LYMPHOCYTES RELATIVE PERCENT: 58 %
MCH RBC QN AUTO: 29 PG (ref 26–34)
MCHC RBC AUTO-ENTMCNC: 32.3 G/DL (ref 31–36)
MCV RBC AUTO: 89.8 FL (ref 80–100)
MONOCYTES ABSOLUTE: 0.6 K/UL (ref 0–1.3)
MONOCYTES RELATIVE PERCENT: 7.9 %
NEUTROPHILS ABSOLUTE: 2.3 K/UL (ref 1.7–7.7)
NEUTROPHILS RELATIVE PERCENT: 32.2 %
PDW BLD-RTO: 15.1 % (ref 12.4–15.4)
PLATELET # BLD: 324 K/UL (ref 135–450)
PMV BLD AUTO: 7.8 FL (ref 5–10.5)
POTASSIUM SERPL-SCNC: 4.1 MMOL/L (ref 3.5–5.1)
RBC # BLD: 4.51 M/UL (ref 4–5.2)
SODIUM BLD-SCNC: 138 MMOL/L (ref 136–145)
T4 FREE: 1.2 NG/DL (ref 0.9–1.8)
TOTAL PROTEIN: 6.6 G/DL (ref 6.4–8.2)
TRIGL SERPL-MCNC: 64 MG/DL (ref 0–150)
TSH SERPL DL<=0.05 MIU/L-ACNC: 4.34 UIU/ML (ref 0.27–4.2)
VLDLC SERPL CALC-MCNC: 13 MG/DL
WBC # BLD: 7.1 K/UL (ref 4–11)

## 2019-05-17 PROCEDURE — 99214 OFFICE O/P EST MOD 30 MIN: CPT | Performed by: INTERNAL MEDICINE

## 2019-05-17 ASSESSMENT — ENCOUNTER SYMPTOMS: SHORTNESS OF BREATH: 0

## 2019-05-17 NOTE — PROGRESS NOTES
Patricia Celis comes for depression. She does not take medication for Her condition. She is not suicidal or homicidal.  The depressionis not associated with excessive sleeping, anhedonia or anxiety. Patient does not have bipolar disease. Patricia Celis returns for follow up of hypertension. Patient has been taking Her medications as prescribed. Patient's blood pressure is  controlled. Side effects related to taking the medications include no medication side effects noted. Review of Systems   Respiratory: Negative for shortness of breath. Cardiovascular: Negative for chest pain. Vitals:    05/17/19 0852   BP: 112/68   Pulse: 60       Physical Exam   Constitutional: She is oriented to person, place, and time. She appears well-developed and well-nourished. No distress. HENT:   Head: Normocephalic and atraumatic. Pulmonary/Chest: Effort normal.   Neurological: She is alert and oriented to person, place, and time. No cranial nerve deficit. Skin: She is not diaphoretic. Psychiatric: She has a normal mood and affect. Her behavior is normal. Judgment and thought content normal.   Vitals reviewed.         Current Outpatient Medications:     amLODIPine (NORVASC) 10 MG tablet, TAKE 1 TABLET DAILY, Disp: 90 tablet, Rfl: 0    triamterene-hydrochlorothiazide (MAXZIDE-25) 37.5-25 MG per tablet, TAKE 1 TABLET DAILY, Disp: 90 tablet, Rfl: 0    mometasone (NASONEX) 50 MCG/ACT nasal spray, 2 sprays by Nasal route daily, Disp: 1 Inhaler, Rfl: 3    lisinopril (PRINIVIL;ZESTRIL) 10 MG tablet, Take 1 tablet by mouth daily, Disp: 7 tablet, Rfl: 0    famotidine (PEPCID) 20 MG tablet, TAKE 1 TABLET NIGHTLY AS NEEDED, Disp: 90 tablet, Rfl: 0    PROAIR  (90 Base) MCG/ACT inhaler, USE 2 INHALATIONS EVERY 6 HOURS AS NEEDED FOR WHEEZING, Disp: 25.5 g, Rfl: 1    escitalopram (LEXAPRO) 10 MG tablet, TAKE 1 TABLET DAILY, Disp: 90 tablet, Rfl: 1    omeprazole (PRILOSEC) 20 MG delayed release capsule, TAKE

## 2019-05-18 LAB
ESTIMATED AVERAGE GLUCOSE: 125.5 MG/DL
HBA1C MFR BLD: 6 %

## 2019-07-23 ENCOUNTER — OFFICE VISIT (OUTPATIENT)
Dept: INTERNAL MEDICINE CLINIC | Age: 74
End: 2019-07-23
Payer: MEDICARE

## 2019-07-23 VITALS
RESPIRATION RATE: 16 BRPM | BODY MASS INDEX: 41.78 KG/M2 | HEIGHT: 67 IN | DIASTOLIC BLOOD PRESSURE: 78 MMHG | SYSTOLIC BLOOD PRESSURE: 136 MMHG | TEMPERATURE: 98.1 F | OXYGEN SATURATION: 98 % | HEART RATE: 74 BPM | WEIGHT: 266.2 LBS

## 2019-07-23 DIAGNOSIS — K64.4 RESIDUAL HEMORRHOIDAL SKIN TAGS: ICD-10-CM

## 2019-07-23 DIAGNOSIS — Z12.31 ENCOUNTER FOR SCREENING MAMMOGRAM FOR BREAST CANCER: ICD-10-CM

## 2019-07-23 DIAGNOSIS — R31.1 BENIGN ESSENTIAL MICROSCOPIC HEMATURIA: ICD-10-CM

## 2019-07-23 DIAGNOSIS — Z23 NEED FOR PROPHYLACTIC VACCINATION AND INOCULATION AGAINST VARICELLA: ICD-10-CM

## 2019-07-23 DIAGNOSIS — I10 ESSENTIAL HYPERTENSION, BENIGN: ICD-10-CM

## 2019-07-23 DIAGNOSIS — J45.20 MILD INTERMITTENT EXTRINSIC ASTHMA WITHOUT COMPLICATION: ICD-10-CM

## 2019-07-23 DIAGNOSIS — R21 RASH AND NONSPECIFIC SKIN ERUPTION: ICD-10-CM

## 2019-07-23 DIAGNOSIS — J20.9 ACUTE BRONCHITIS, UNSPECIFIED ORGANISM: ICD-10-CM

## 2019-07-23 DIAGNOSIS — R21 RASH AND NONSPECIFIC SKIN ERUPTION: Primary | ICD-10-CM

## 2019-07-23 DIAGNOSIS — F33.0 MILD EPISODE OF RECURRENT MAJOR DEPRESSIVE DISORDER (HCC): ICD-10-CM

## 2019-07-23 DIAGNOSIS — R31.9 URINARY TRACT INFECTION WITH HEMATURIA, SITE UNSPECIFIED: ICD-10-CM

## 2019-07-23 DIAGNOSIS — N39.0 URINARY TRACT INFECTION WITH HEMATURIA, SITE UNSPECIFIED: ICD-10-CM

## 2019-07-23 DIAGNOSIS — Z78.0 POST-MENOPAUSAL: ICD-10-CM

## 2019-07-23 LAB
BILIRUBIN, POC: NORMAL
BLOOD URINE, POC: NORMAL
CLARITY, POC: CLEAR
COLOR, POC: YELLOW
GLUCOSE URINE, POC: NORMAL
KETONES, POC: NORMAL
LEUKOCYTE EST, POC: NORMAL
NITRITE, POC: NORMAL
PH, POC: 7.5
PROTEIN, POC: NORMAL
SPECIFIC GRAVITY, POC: 1.02
UROBILINOGEN, POC: 0.02

## 2019-07-23 PROCEDURE — 81002 URINALYSIS NONAUTO W/O SCOPE: CPT | Performed by: INTERNAL MEDICINE

## 2019-07-23 PROCEDURE — 99214 OFFICE O/P EST MOD 30 MIN: CPT | Performed by: INTERNAL MEDICINE

## 2019-07-23 RX ORDER — SULFAMETHOXAZOLE AND TRIMETHOPRIM 800; 160 MG/1; MG/1
1 TABLET ORAL 2 TIMES DAILY
Qty: 20 TABLET | Refills: 0 | Status: SHIPPED | OUTPATIENT
Start: 2019-07-23 | End: 2019-08-02

## 2019-07-23 RX ORDER — MOMETASONE FUROATE 50 UG/1
2 SPRAY, METERED NASAL DAILY
Qty: 1 INHALER | Refills: 3 | Status: SHIPPED | OUTPATIENT
Start: 2019-07-23 | End: 2020-03-30

## 2019-07-23 RX ORDER — ACYCLOVIR 50 MG/G
OINTMENT TOPICAL
Qty: 30 G | Refills: 1 | Status: SHIPPED | OUTPATIENT
Start: 2019-07-23 | End: 2019-07-30

## 2019-07-23 RX ORDER — HYDROCORTISONE ACETATE 25 MG/1
25 SUPPOSITORY RECTAL EVERY 12 HOURS
Qty: 24 SUPPOSITORY | Refills: 2 | Status: SHIPPED | OUTPATIENT
Start: 2019-07-23 | End: 2022-09-02 | Stop reason: ALTCHOICE

## 2019-07-23 RX ORDER — ATORVASTATIN CALCIUM 20 MG/1
TABLET, FILM COATED ORAL
Qty: 90 TABLET | Refills: 3 | Status: SHIPPED | OUTPATIENT
Start: 2019-07-23 | End: 2020-07-21

## 2019-07-23 RX ORDER — LISINOPRIL 10 MG/1
10 TABLET ORAL DAILY
Qty: 7 TABLET | Refills: 0 | Status: SHIPPED | OUTPATIENT
Start: 2019-07-23 | End: 2019-08-30

## 2019-07-23 RX ORDER — BUDESONIDE AND FORMOTEROL FUMARATE DIHYDRATE 160; 4.5 UG/1; UG/1
2 AEROSOL RESPIRATORY (INHALATION) 2 TIMES DAILY
COMMUNITY

## 2019-07-23 RX ORDER — ESCITALOPRAM OXALATE 10 MG/1
TABLET ORAL
Qty: 90 TABLET | Refills: 1 | Status: SHIPPED | OUTPATIENT
Start: 2019-07-23 | End: 2020-01-21

## 2019-07-23 RX ORDER — ALBUTEROL SULFATE 2.5 MG/3ML
2.5 SOLUTION RESPIRATORY (INHALATION) ONCE
Qty: 25 VIAL | Refills: 2 | Status: SHIPPED | OUTPATIENT
Start: 2019-07-23 | End: 2021-09-01

## 2019-07-23 RX ORDER — AMLODIPINE BESYLATE 10 MG/1
TABLET ORAL
Qty: 90 TABLET | Refills: 0 | Status: SHIPPED | OUTPATIENT
Start: 2019-07-23 | End: 2019-10-29 | Stop reason: SDUPTHER

## 2019-07-23 RX ORDER — TRIAMTERENE AND HYDROCHLOROTHIAZIDE 37.5; 25 MG/1; MG/1
TABLET ORAL
Qty: 90 TABLET | Refills: 0 | Status: SHIPPED | OUTPATIENT
Start: 2019-07-23 | End: 2019-10-29 | Stop reason: SDUPTHER

## 2019-07-23 RX ORDER — ESCITALOPRAM OXALATE 10 MG/1
TABLET ORAL
Qty: 90 TABLET | Refills: 1 | Status: SHIPPED | OUTPATIENT
Start: 2019-07-23 | End: 2019-07-23

## 2019-07-23 ASSESSMENT — ENCOUNTER SYMPTOMS
NAUSEA: 0
VOMITING: 0
DIARRHEA: 0
BACK PAIN: 0
CONSTIPATION: 0
ABDOMINAL PAIN: 0
SORE THROAT: 0

## 2019-07-23 NOTE — PROGRESS NOTES
0    triamterene-hydrochlorothiazide (MAXZIDE-25) 37.5-25 MG per tablet TAKE 1 TABLET DAILY 90 tablet 0    mometasone (NASONEX) 50 MCG/ACT nasal spray 2 sprays by Nasal route daily 1 Inhaler 3    lisinopril (PRINIVIL;ZESTRIL) 10 MG tablet Take 1 tablet by mouth daily 7 tablet 0    PROAIR  (90 Base) MCG/ACT inhaler USE 2 INHALATIONS EVERY 6 HOURS AS NEEDED FOR WHEEZING 25.5 g 1    omeprazole (PRILOSEC) 20 MG delayed release capsule TAKE 1 CAPSULE DAILY 90 capsule 3    atorvastatin (LIPITOR) 20 MG tablet TAKE 1 TABLET DAILY 90 tablet 3    Cholecalciferol (VITAMIN D) 2000 UNITS CAPS capsule Take by mouth      dorzolamide (TRUSOPT) 2 % ophthalmic solution Place 2 drops into both eyes 2 times daily 10 mL 11    aspirin 325 MG tablet Take 325 mg by mouth daily.  B Complex-C-Folic Acid (VITABEE/C PO) Take 1 tablet by mouth.  famotidine (PEPCID) 20 MG tablet TAKE 1 TABLET NIGHTLY AS NEEDED (PATIENT NEEDS AN APPOINTMENT PRIOR TO ANY ADDITIONAL REFILLS) 90 tablet 1    escitalopram (LEXAPRO) 10 MG tablet TAKE 1 TABLET DAILY (Patient not taking: Reported on 7/23/2019) 90 tablet 1    albuterol (PROVENTIL) (2.5 MG/3ML) 0.083% nebulizer solution Take 3 mLs by nebulization once for 1 dose 25 vial 2    FISH OIL Take 1,000 mg by mouth daily.  Calcium Carb-Cholecalciferol (CALCIUM 500 +D) 500-400 MG-UNIT TABS Take 2 tablets by mouth daily. No current facility-administered medications for this visit. Vitals:    07/23/19 0934   BP: 136/78   Pulse: 74   Resp: 16   Temp: 98.1 °F (36.7 °C)   TempSrc: Oral   SpO2: 98%   Weight: 266 lb 3.2 oz (120.7 kg)   Height: 5' 7\" (1.702 m)     Body mass index is 41.69 kg/m².      Wt Readings from Last 3 Encounters:   07/23/19 266 lb 3.2 oz (120.7 kg)   05/17/19 266 lb (120.7 kg)   05/03/19 268 lb (121.6 kg)     BP Readings from Last 3 Encounters:   07/23/19 136/78   05/17/19 112/68   05/03/19 126/80     Objective:   Physical Exam   Constitutional: She is oriented to person, place, and time. She appears well-developed and well-nourished. No distress. HENT:   Head: Normocephalic and atraumatic. Right Ear: External ear normal.   Left Ear: External ear normal.   Eyes: Pupils are equal, round, and reactive to light. Conjunctivae and EOM are normal.   Neck: Normal range of motion. Neck supple. No thyromegaly present. Cardiovascular: Normal rate, regular rhythm, normal heart sounds and intact distal pulses. No murmur heard. Pulmonary/Chest: Effort normal and breath sounds normal. No respiratory distress. Abdominal: Soft. Bowel sounds are normal. She exhibits no distension. There is no tenderness. Musculoskeletal: Normal range of motion. Neurological: She is alert and oriented to person, place, and time. Skin: Skin is warm. Capillary refill takes less than 2 seconds. Psychiatric: She has a normal mood and affect. Her behavior is normal. Judgment and thought content normal.   Nursing note and vitals reviewed. Assessment/Plan:  Beverly Razo was seen today for vaginal bleeding. Diagnoses and all orders for this visit:    Rash and nonspecific skin eruption  -     HERPES SIMPLEX VIRUS (HSV) I/II ANTIBODIES IGG & IGM W/ REFLEX; Future  -     acyclovir (ZOVIRAX) 5 % ointment; Apply topically every 3 hours. Essential hypertension, benign  -     lisinopril (PRINIVIL;ZESTRIL) 10 MG tablet; Take 1 tablet by mouth daily  -     amLODIPine (NORVASC) 10 MG tablet; TAKE 1 TABLET DAILY  -     triamterene-hydrochlorothiazide (MAXZIDE-25) 37.5-25 MG per tablet; TAKE 1 TABLET DAILY    Mild episode of recurrent major depressive disorder (HCC)  -     Discontinue: escitalopram (LEXAPRO) 10 MG tablet; TAKE 1 TABLET DAILY  -     escitalopram (LEXAPRO) 10 MG tablet; TAKE 1 TABLET DAILY    Acute bronchitis, unspecified organism  -     albuterol (PROVENTIL) (2.5 MG/3ML) 0.083% nebulizer solution;  Take 3 mLs by nebulization once for 1 dose    Encounter for screening mammogram for breast cancer  -     [facility] Digital Screen Bilateral [[id number]]; Future    Need for prophylactic vaccination and inoculation against varicella  -     zoster recombinant adjuvanted vaccine [facility]) 50 MCG/0.5ML SUSR injection; Inject 0.5 mLs into the muscle once for 1 dose 50 MCG IM then repeat 2-6 months. Residual hemorrhoidal skin tags  -     hydrocortisone (ANUSOL-HC) 25 MG suppository; Place 1 suppository rectally every 12 hours    Other orders  -     atorvastatin (LIPITOR) 20 MG tablet; TAKE 1 TABLET DAILY      No follow-ups on file.       Wally Harmon MD

## 2019-07-25 LAB — URINE CULTURE, ROUTINE: NORMAL

## 2019-07-26 LAB
HERPES TYPE 1/2 IGM COMBINED: 0.54 IV
HERPES TYPE I/II IGG COMBINED: >22.4 IV
HSV 1 GLYCOPROTEIN G AB IGG: 27.2 IV
HSV 2 GLYCOPROTEIN G AB IGG: 12.3 IV

## 2019-07-30 DIAGNOSIS — I10 ESSENTIAL HYPERTENSION, BENIGN: ICD-10-CM

## 2019-07-30 DIAGNOSIS — R31.1 BENIGN ESSENTIAL MICROSCOPIC HEMATURIA: ICD-10-CM

## 2019-08-21 ENCOUNTER — TELEPHONE (OUTPATIENT)
Dept: INTERNAL MEDICINE CLINIC | Age: 74
End: 2019-08-21

## 2019-08-26 ENCOUNTER — HOSPITAL ENCOUNTER (OUTPATIENT)
Dept: MAMMOGRAPHY | Age: 74
Discharge: HOME | End: 2019-08-30
Payer: MEDICARE

## 2019-08-26 DIAGNOSIS — Z12.31 VISIT FOR SCREENING MAMMOGRAM: ICD-10-CM

## 2019-08-26 PROCEDURE — 77063 BREAST TOMOSYNTHESIS BI: CPT

## 2019-08-30 ENCOUNTER — OFFICE VISIT (OUTPATIENT)
Dept: INTERNAL MEDICINE CLINIC | Age: 74
End: 2019-08-30
Payer: MEDICARE

## 2019-08-30 VITALS
WEIGHT: 267 LBS | BODY MASS INDEX: 41.91 KG/M2 | HEIGHT: 67 IN | HEART RATE: 54 BPM | DIASTOLIC BLOOD PRESSURE: 80 MMHG | SYSTOLIC BLOOD PRESSURE: 128 MMHG

## 2019-08-30 DIAGNOSIS — Z00.00 ROUTINE GENERAL MEDICAL EXAMINATION AT A HEALTH CARE FACILITY: Primary | ICD-10-CM

## 2019-08-30 DIAGNOSIS — R05.3 PERSISTENT COUGH: ICD-10-CM

## 2019-08-30 DIAGNOSIS — K21.9 GASTROESOPHAGEAL REFLUX DISEASE WITHOUT ESOPHAGITIS: Chronic | ICD-10-CM

## 2019-08-30 DIAGNOSIS — E03.9 ACQUIRED HYPOTHYROIDISM: ICD-10-CM

## 2019-08-30 DIAGNOSIS — Z23 NEED FOR SHINGLES VACCINE: ICD-10-CM

## 2019-08-30 DIAGNOSIS — E66.01 MORBID OBESITY (HCC): Chronic | ICD-10-CM

## 2019-08-30 DIAGNOSIS — F33.0 MILD EPISODE OF RECURRENT MAJOR DEPRESSIVE DISORDER (HCC): ICD-10-CM

## 2019-08-30 DIAGNOSIS — I10 ESSENTIAL HYPERTENSION, BENIGN: ICD-10-CM

## 2019-08-30 PROCEDURE — G0438 PPPS, INITIAL VISIT: HCPCS | Performed by: INTERNAL MEDICINE

## 2019-08-30 RX ORDER — DEXLANSOPRAZOLE 60 MG/1
60 CAPSULE, DELAYED RELEASE ORAL DAILY
COMMUNITY
End: 2022-09-02 | Stop reason: ALTCHOICE

## 2019-08-30 ASSESSMENT — PATIENT HEALTH QUESTIONNAIRE - PHQ9
SUM OF ALL RESPONSES TO PHQ QUESTIONS 1-9: 1
SUM OF ALL RESPONSES TO PHQ QUESTIONS 1-9: 1

## 2019-08-30 ASSESSMENT — LIFESTYLE VARIABLES: HOW OFTEN DO YOU HAVE A DRINK CONTAINING ALCOHOL: 0

## 2019-08-30 NOTE — PROGRESS NOTES
NEEDS AN APPOINTMENT PRIOR TO ANY ADDITIONAL REFILLS) Yes David Byers MD   Cholecalciferol (VITAMIN D) 2000 UNITS CAPS capsule Take by mouth Yes Historical Provider, MD   dorzolamide (TRUSOPT) 2 % ophthalmic solution Place 2 drops into both eyes 2 times daily Yes David Byers MD   aspirin 325 MG tablet Take 325 mg by mouth daily. Yes Historical Provider, MD   B Complex-C-Folic Acid (VITABEE/C PO) Take 1 tablet by mouth. Yes Historical Provider, MD   FISH OIL Take 1,000 mg by mouth daily. Yes Historical Provider, MD   Calcium Carb-Cholecalciferol (CALCIUM 500 +D) 500-400 MG-UNIT TABS Take 2 tablets by mouth daily.    Yes Historical Provider, MD   albuterol (PROVENTIL) (2.5 MG/3ML) 0.083% nebulizer solution Take 3 mLs by nebulization once for 1 dose  Violeta Landa MD   omeprazole (PRILOSEC) 20 MG delayed release capsule TAKE 1 CAPSULE DAILY  Patient not taking: Reported on 8/30/2019  David Byers MD     Past Medical History:   Diagnosis Date    Asthma     Essential hypertension, benign 6/13/2011    Extrinsic asthma 6/13/2011    GERD (gastroesophageal reflux disease)     EGD DONE    Glaucoma (increased eye pressure) 2015    Hyperlipidemia     Mild episode of recurrent major depressive disorder ([facility] [address] 12/21/2016    Mixed hyperlipidemia 5/10/2016    Reactive airway disease      Past Surgical History:   Procedure Laterality Date    HYSTERECTOMY      PARTIAL HYSTERECTOMY       Family History   Problem Relation Age of Onset    Kidney Disease Mother     Diabetes Father     Coronary Art Dis Brother        CareTeam (Including outside providers/suppliers regularly involved in providing care):   Patient Care Team:  David Byers MD as PCP - Piter Conti MD as PCP - [facility] Empaneled Provider    Wt Readings from Last 3 Encounters:   08/30/19 267 lb (121.1 kg)   07/23/19 266 lb 3.2 oz (120.7 kg)   05/17/19 266 lb (120.7 kg)     Vitals:    08/30/19 1104   BP: 128/80   Pulse: 54 Health Maintenance   Topic Date Due    Annual Wellness Visit (AWV)  12/22/2008    Shingles Vaccine (2 of 3) 12/19/2009    DTaP/Tdap/Td vaccine (2 - Tdap) 11/10/2021 (Originally 1/17/2017)    Flu vaccine (1) 09/01/2019    A1C test (Diabetic or Prediabetic)  05/17/2020    Potassium monitoring  05/17/2020    Creatinine monitoring  05/17/2020    Breast cancer screen  08/26/2021    Colon cancer screen colonoscopy  09/05/2022    Lipid screen  05/17/2024    DEXA (modify frequency per FRAX score)  Completed    Pneumococcal 65+ years Vaccine  Completed    Hepatitis C screen  Completed     Recommendations for Preventive Services Due: see orders and patient instructions/AVS.  . Recommended screening schedule for the next 5-10 years is provided to the patient in written form: see Patient Instructions/AVS.    Debra Ken was seen today for medicare awv and cough. Diagnoses and all orders for this visit:    Routine general medical examination at a health care facility    Mild episode of recurrent major depressive disorder [facility])  Comments:  Patient still has symptoms of some major depressive disorder. They are being managed however. She takes Lexapro. Morbid obesity ([address]  Comments:  Chronic and stable. Gastroesophageal reflux disease without esophagitis  Comments:  She was started on Dexilant and is taking that regularly. Essential hypertension, benign  Comments:  Chronic controlled. Acquired hypothyroidism    Need for shingles vaccine  -     zoster recombinant adjuvanted vaccine [facility]) 50 MCG/0.5ML SUSR injection;  Inject 0.5 mLs into the muscle once for 1 dose    Persistent cough  Comments:  Discontinue lisinopril        Janina Cintron MD

## 2019-10-02 ENCOUNTER — CARE COORDINATION (OUTPATIENT)
Dept: CARE COORDINATION | Age: 74
End: 2019-10-02

## 2019-10-02 ENCOUNTER — TELEPHONE (OUTPATIENT)
Dept: INTERNAL MEDICINE CLINIC | Age: 74
End: 2019-10-02

## 2019-10-02 ENCOUNTER — NURSE ONLY (OUTPATIENT)
Dept: INTERNAL MEDICINE CLINIC | Age: 74
End: 2019-10-02
Payer: MEDICARE

## 2019-10-02 DIAGNOSIS — Z23 NEED FOR INFLUENZA VACCINATION: Primary | ICD-10-CM

## 2019-10-02 PROCEDURE — G0008 ADMIN INFLUENZA VIRUS VAC: HCPCS | Performed by: INTERNAL MEDICINE

## 2019-10-02 PROCEDURE — 90653 IIV ADJUVANT VACCINE IM: CPT | Performed by: INTERNAL MEDICINE

## 2019-10-02 RX ORDER — LOSARTAN POTASSIUM 50 MG/1
50 TABLET ORAL DAILY
Qty: 30 TABLET | Refills: 0 | Status: SHIPPED | OUTPATIENT
Start: 2019-10-02 | End: 2019-11-11 | Stop reason: SDUPTHER

## 2019-10-04 RX ORDER — PANTOPRAZOLE SODIUM 40 MG/1
40 TABLET, DELAYED RELEASE ORAL
Qty: 180 TABLET | Refills: 0 | Status: SHIPPED | OUTPATIENT
Start: 2019-10-04 | End: 2019-10-10 | Stop reason: SDUPTHER

## 2019-10-10 ENCOUNTER — CARE COORDINATION (OUTPATIENT)
Dept: CARE COORDINATION | Age: 74
End: 2019-10-10

## 2019-10-10 RX ORDER — PANTOPRAZOLE SODIUM 40 MG/1
40 TABLET, DELAYED RELEASE ORAL
Qty: 180 TABLET | Refills: 0 | Status: SHIPPED | OUTPATIENT
Start: 2019-10-10 | End: 2020-05-27 | Stop reason: SDUPTHER

## 2019-10-10 SDOH — HEALTH STABILITY: PHYSICAL HEALTH: ON AVERAGE, HOW MANY MINUTES DO YOU ENGAGE IN EXERCISE AT THIS LEVEL?: 0 MIN

## 2019-10-10 SDOH — ECONOMIC STABILITY: TRANSPORTATION INSECURITY
IN THE PAST 12 MONTHS, HAS LACK OF TRANSPORTATION KEPT YOU FROM MEETINGS, WORK, OR FROM GETTING THINGS NEEDED FOR DAILY LIVING?: NO

## 2019-10-10 SDOH — ECONOMIC STABILITY: FOOD INSECURITY: WITHIN THE PAST 12 MONTHS, THE FOOD YOU BOUGHT JUST DIDN'T LAST AND YOU DIDN'T HAVE MONEY TO GET MORE.: NEVER TRUE

## 2019-10-10 SDOH — ECONOMIC STABILITY: INCOME INSECURITY: HOW HARD IS IT FOR YOU TO PAY FOR THE VERY BASICS LIKE FOOD, HOUSING, MEDICAL CARE, AND HEATING?: SOMEWHAT HARD

## 2019-10-10 SDOH — HEALTH STABILITY: PHYSICAL HEALTH: ON AVERAGE, HOW MANY DAYS PER WEEK DO YOU ENGAGE IN MODERATE TO STRENUOUS EXERCISE (LIKE A BRISK WALK)?: 0 DAYS

## 2019-10-10 SDOH — ECONOMIC STABILITY: FOOD INSECURITY: WITHIN THE PAST 12 MONTHS, YOU WORRIED THAT YOUR FOOD WOULD RUN OUT BEFORE YOU GOT MONEY TO BUY MORE.: NEVER TRUE

## 2019-10-10 SDOH — ECONOMIC STABILITY: TRANSPORTATION INSECURITY
IN THE PAST 12 MONTHS, HAS THE LACK OF TRANSPORTATION KEPT YOU FROM MEDICAL APPOINTMENTS OR FROM GETTING MEDICATIONS?: NO

## 2019-10-29 DIAGNOSIS — I10 ESSENTIAL HYPERTENSION, BENIGN: ICD-10-CM

## 2019-10-29 RX ORDER — AMLODIPINE BESYLATE 10 MG/1
TABLET ORAL
Qty: 90 TABLET | Refills: 4 | Status: SHIPPED | OUTPATIENT
Start: 2019-10-29 | End: 2021-01-23

## 2019-10-29 RX ORDER — TRIAMTERENE AND HYDROCHLOROTHIAZIDE 37.5; 25 MG/1; MG/1
TABLET ORAL
Qty: 90 TABLET | Refills: 4 | Status: SHIPPED | OUTPATIENT
Start: 2019-10-29 | End: 2021-01-23

## 2019-11-11 ENCOUNTER — CARE COORDINATION (OUTPATIENT)
Dept: CARE COORDINATION | Age: 74
End: 2019-11-11

## 2019-11-11 RX ORDER — LOSARTAN POTASSIUM 50 MG/1
50 TABLET ORAL DAILY
Qty: 30 TABLET | Refills: 0 | Status: SHIPPED | OUTPATIENT
Start: 2019-11-11 | End: 2019-11-11 | Stop reason: SDUPTHER

## 2019-11-11 RX ORDER — LOSARTAN POTASSIUM 50 MG/1
50 TABLET ORAL DAILY
Qty: 90 TABLET | Refills: 1 | Status: SHIPPED | OUTPATIENT
Start: 2019-11-11 | End: 2020-05-11

## 2019-12-03 ENCOUNTER — CARE COORDINATION (OUTPATIENT)
Dept: CARE COORDINATION | Age: 74
End: 2019-12-03

## 2019-12-30 ENCOUNTER — OFFICE VISIT (OUTPATIENT)
Dept: INTERNAL MEDICINE CLINIC | Age: 74
End: 2019-12-30
Payer: MEDICARE

## 2019-12-30 VITALS
DIASTOLIC BLOOD PRESSURE: 82 MMHG | BODY MASS INDEX: 41.59 KG/M2 | SYSTOLIC BLOOD PRESSURE: 136 MMHG | HEIGHT: 67 IN | HEART RATE: 65 BPM | WEIGHT: 265 LBS

## 2019-12-30 DIAGNOSIS — Z13.29 SCREENING FOR THYROID DISORDER: ICD-10-CM

## 2019-12-30 DIAGNOSIS — I10 ESSENTIAL HYPERTENSION, BENIGN: ICD-10-CM

## 2019-12-30 DIAGNOSIS — M17.0 PRIMARY OSTEOARTHRITIS OF BOTH KNEES: Primary | ICD-10-CM

## 2019-12-30 DIAGNOSIS — E78.2 MIXED HYPERLIPIDEMIA: Chronic | ICD-10-CM

## 2019-12-30 DIAGNOSIS — K21.9 GASTROESOPHAGEAL REFLUX DISEASE WITHOUT ESOPHAGITIS: Chronic | ICD-10-CM

## 2019-12-30 LAB
T4 FREE: 1 NG/DL (ref 0.9–1.8)
TSH SERPL DL<=0.05 MIU/L-ACNC: 3.19 UIU/ML (ref 0.27–4.2)

## 2019-12-30 PROCEDURE — 99214 OFFICE O/P EST MOD 30 MIN: CPT | Performed by: INTERNAL MEDICINE

## 2019-12-30 ASSESSMENT — ENCOUNTER SYMPTOMS: SHORTNESS OF BREATH: 0

## 2020-01-09 ENCOUNTER — CARE COORDINATION (OUTPATIENT)
Dept: CARE COORDINATION | Age: 75
End: 2020-01-09

## 2020-01-21 RX ORDER — ESCITALOPRAM OXALATE 10 MG/1
TABLET ORAL
Qty: 90 TABLET | Refills: 4 | Status: SHIPPED | OUTPATIENT
Start: 2020-01-21 | End: 2021-04-16

## 2020-01-29 ENCOUNTER — CARE COORDINATION (OUTPATIENT)
Dept: CARE COORDINATION | Age: 75
End: 2020-01-29

## 2020-01-29 NOTE — CARE COORDINATION
daily    Historical Provider, MD   PROAIR  (48 Base) MCG/ACT inhaler USE 2 INHALATIONS EVERY 6 HOURS AS NEEDED FOR WHEEZING 8/9/19   Talia Steele MD   atorvastatin (LIPITOR) 20 MG tablet TAKE 1 TABLET DAILY 7/23/19   Jessica Brito MD   albuterol (PROVENTIL) (2.5 MG/3ML) 0.083% nebulizer solution Take 3 mLs by nebulization once for 1 dose 7/23/19 7/23/19  Jessica Brito MD   budesonide-formoterol (SYMBICORT) 160-4.5 MCG/ACT AERO Inhale 2 puffs into the lungs 2 times daily    Historical Provider, MD   hydrocortisone (ANUSOL-HC) 25 MG suppository Place 1 suppository rectally every 12 hours 7/23/19   Jessica Brito MD   mometasone (NASONEX) 50 MCG/ACT nasal spray 2 sprays by Nasal route daily 7/23/19   Jessiac Brito MD   Cholecalciferol (VITAMIN D) 2000 UNITS CAPS capsule Take by mouth    Historical Provider, MD   dorzolamide (TRUSOPT) 2 % ophthalmic solution Place 2 drops into both eyes 2 times daily 10/26/15   Talia Steele MD   aspirin 325 MG tablet Take 325 mg by mouth daily. Historical Provider, MD   B Complex-C-Folic Acid (VITABEE/C PO) Take 1 tablet by mouth. Historical Provider, MD   FISH OIL Take 1,000 mg by mouth daily. Historical Provider, MD   Calcium Carb-Cholecalciferol (CALCIUM 500 +D) 500-400 MG-UNIT TABS Take 2 tablets by mouth daily.       Historical Provider, MD       Future Appointments   Date Time Provider Janelle Jackson   3/30/2020  9:15 AM MD GASTON [facility]ong [facility]   8/31/2020  9:30 AM MD GASTON [facility]

## 2020-03-09 ENCOUNTER — CARE COORDINATION (OUTPATIENT)
Dept: CARE COORDINATION | Age: 75
End: 2020-03-09

## 2020-03-09 NOTE — CARE COORDINATION
tablet by mouth daily 11/11/19   Maxime Hogan MD   amLODIPine (NORVASC) 10 MG tablet TAKE 1 TABLET DAILY 10/29/19   Jeff Glass MD   triamterene-hydrochlorothiazide [facility]) 37.5-25 MG per tablet TAKE 1 TABLET DAILY 10/29/19   Jeff Glass MD   pantoprazole (PROTONIX) 40 MG tablet Take 1 tablet by mouth 2 times daily (before meals) 10/10/19   Roxi Birch MD   dexlansoprazole (DEXILANT) 60 MG CPDR delayed release capsule Take 60 mg by mouth daily    Historical Provider, MD   atorvastatin (LIPITOR) 20 MG tablet TAKE 1 TABLET DAILY 7/23/19   Jeff Glass MD   albuterol (PROVENTIL) (2.5 MG/3ML) 0.083% nebulizer solution Take 3 mLs by nebulization once for 1 dose 7/23/19 7/23/19  Jeff Glass MD   budesonide-formoterol (SYMBICORT) 160-4.5 MCG/ACT AERO Inhale 2 puffs into the lungs 2 times daily    Historical Provider, MD   hydrocortisone (ANUSOL-HC) 25 MG suppository Place 1 suppository rectally every 12 hours 7/23/19   Jeff Glass MD   mometasone (NASONEX) 50 MCG/ACT nasal spray 2 sprays by Nasal route daily 7/23/19   Jeff Glass MD   Cholecalciferol (VITAMIN D) 2000 UNITS CAPS capsule Take by mouth    Historical Provider, MD   dorzolamide (TRUSOPT) 2 % ophthalmic solution Place 2 drops into both eyes 2 times daily 10/26/15   Maxime Hogan MD   aspirin 325 MG tablet Take 325 mg by mouth daily. Historical Provider, MD   B Complex-C-Folic Acid (VITABEE/C PO) Take 1 tablet by mouth. Historical Provider, MD   FISH OIL Take 1,000 mg by mouth daily. Historical Provider, MD   Calcium Carb-Cholecalciferol (CALCIUM 500 +D) 500-400 MG-UNIT TABS Take 2 tablets by mouth daily.       Historical Provider, MD       Future Appointments   Date Time Provider Janelle Jackson   3/30/2020  9:15 AM MD GASTON Aceved[facility]   8/31/2020  9:30 AM MD GASTON Aceved[facility]

## 2020-03-30 ENCOUNTER — VIRTUAL VISIT (OUTPATIENT)
Dept: INTERNAL MEDICINE CLINIC | Age: 75
End: 2020-03-30
Payer: MEDICARE

## 2020-03-30 VITALS — HEIGHT: 67 IN | HEART RATE: 44 BPM | BODY MASS INDEX: 41.28 KG/M2 | WEIGHT: 263 LBS

## 2020-03-30 PROCEDURE — G8510 SCR DEP NEG, NO PLAN REQD: HCPCS | Performed by: INTERNAL MEDICINE

## 2020-03-30 PROCEDURE — 99213 OFFICE O/P EST LOW 20 MIN: CPT | Performed by: INTERNAL MEDICINE

## 2020-03-30 RX ORDER — MOMETASONE FUROATE 50 UG/1
2 SPRAY, METERED NASAL DAILY
Qty: 1 INHALER | Refills: 3 | Status: SHIPPED | OUTPATIENT
Start: 2020-03-30 | End: 2022-09-02 | Stop reason: SDUPTHER

## 2020-03-30 ASSESSMENT — PATIENT HEALTH QUESTIONNAIRE - PHQ9
SUM OF ALL RESPONSES TO PHQ9 QUESTIONS 1 & 2: 0
SUM OF ALL RESPONSES TO PHQ QUESTIONS 1-9: 0
1. LITTLE INTEREST OR PLEASURE IN DOING THINGS: 0
SUM OF ALL RESPONSES TO PHQ QUESTIONS 1-9: 0
2. FEELING DOWN, DEPRESSED OR HOPELESS: 0

## 2020-03-30 NOTE — PROGRESS NOTES
SUBJECTIVE:  Patient ID:Juliana Koenig is a 76 y.o. y.o. female     HPI    Nikkie Evans returns for follow up of hypertension. Patient has been taking Her medications as prescribed. Patient's blood pressureis  controlled. Side effects related to taking the medications include no medication side effects noted    Patient returns for follow up of hyperlipidemia. Patient has been taking Her medications as prescribed. Patient's lipids are controlled. Side effects related to taking the medications include none. She has not had clinical consequences related to hyperlipidemia including, but not limited to acute coronary syndrome, stroke or chronic kidney disease. PHQ Scores 3/30/2020 8/30/2019 7/2/2018 7/29/2015   PHQ2 Score 0 1 0 0   PHQ9 Score 0 1 0 0     Interpretation of Total Score Depression Severity: 1-4 = Minimal depression, 5-9 = Mild depression, 10-14 = Moderate depression, 15-19 = Moderately severe depression, 20-27 = Severe depression    The 10-year ASCVD risk score (Miles Eduardo et al., 2013) is: 13.9%    Values used to calculate the score:      Age: 76 years      Sex: Female      Is Non- : Yes      Diabetic: No      Tobacco smoker: No      Systolic Blood Pressure: 816 mmHg      Is BP treated: Yes      HDL Cholesterol: 79 mg/dL      Total Cholesterol: 150 mg/dL      Review of Systems    OBJECTIVE:    Pulse (!) 44   Ht 5' 7\" (1.702 m)   Wt 263 lb (119.3 kg)   BMI 41.19 kg/m²      Physical Exam  Vitals signs reviewed. Constitutional:       General: She is not in acute distress. Appearance: She is well-developed. She is not diaphoretic. HENT:      Head: Normocephalic and atraumatic. Pulmonary:      Effort: Pulmonary effort is normal.   Neurological:      Mental Status: She is alert and oriented to person, place, and time. Cranial Nerves: No cranial nerve deficit. Psychiatric:         Behavior: Behavior normal.         Thought Content:  Thought content normal. Judgment: Judgment normal.          Current Outpatient Medications:     PROAIR  (90 Base) MCG/ACT inhaler, USE 2 INHALATIONS EVERY 6 HOURS AS NEEDED FOR WHEEZING, Disp: 25.5 g, Rfl: 3    escitalopram (LEXAPRO) 10 MG tablet, TAKE 1 TABLET DAILY, Disp: 90 tablet, Rfl: 4    famotidine (PEPCID) 20 MG tablet, Take 1 tablet by mouth nightly, Disp: 90 tablet, Rfl: 1    montelukast (SINGULAIR) 10 MG tablet, TAKE 1 TABLET DAILY, Disp: 90 tablet, Rfl: 1    omeprazole (PRILOSEC) 20 MG delayed release capsule, TAKE 1 CAPSULE DAILY, Disp: 90 capsule, Rfl: 1    losartan (COZAAR) 50 MG tablet, Take 1 tablet by mouth daily, Disp: 90 tablet, Rfl: 1    amLODIPine (NORVASC) 10 MG tablet, TAKE 1 TABLET DAILY, Disp: 90 tablet, Rfl: 4    triamterene-hydrochlorothiazide (MAXZIDE-25) 37.5-25 MG per tablet, TAKE 1 TABLET DAILY, Disp: 90 tablet, Rfl: 4    pantoprazole (PROTONIX) 40 MG tablet, Take 1 tablet by mouth 2 times daily (before meals) (Patient not taking: Reported on 3/9/2020), Disp: 180 tablet, Rfl: 0    dexlansoprazole (DEXILANT) 60 MG CPDR delayed release capsule, Take 60 mg by mouth daily, Disp: , Rfl:     atorvastatin (LIPITOR) 20 MG tablet, TAKE 1 TABLET DAILY, Disp: 90 tablet, Rfl: 3    albuterol (PROVENTIL) (2.5 MG/3ML) 0.083% nebulizer solution, Take 3 mLs by nebulization once for 1 dose, Disp: 25 vial, Rfl: 2    budesonide-formoterol (SYMBICORT) 160-4.5 MCG/ACT AERO, Inhale 2 puffs into the lungs 2 times daily, Disp: , Rfl:     hydrocortisone (ANUSOL-HC) 25 MG suppository, Place 1 suppository rectally every 12 hours, Disp: 24 suppository, Rfl: 2    mometasone (NASONEX) 50 MCG/ACT nasal spray, 2 sprays by Nasal route daily, Disp: 1 Inhaler, Rfl: 3    Cholecalciferol (VITAMIN D) 2000 UNITS CAPS capsule, Take by mouth, Disp: , Rfl:     dorzolamide (TRUSOPT) 2 % ophthalmic solution, Place 2 drops into both eyes 2 times daily, Disp: 10 mL, Rfl: 11    aspirin 325 MG tablet, Take 325 mg by mouth

## 2020-05-27 ENCOUNTER — TELEPHONE (OUTPATIENT)
Dept: INTERNAL MEDICINE CLINIC | Age: 75
End: 2020-05-27

## 2020-05-27 RX ORDER — PANTOPRAZOLE SODIUM 40 MG/1
40 TABLET, DELAYED RELEASE ORAL
Qty: 180 TABLET | Refills: 1 | Status: SHIPPED | OUTPATIENT
Start: 2020-05-27 | End: 2021-01-08 | Stop reason: SDUPTHER

## 2020-08-25 ENCOUNTER — HOSPITAL ENCOUNTER (OUTPATIENT)
Dept: MAMMOGRAPHY | Age: 75
Discharge: HOME | End: 2020-08-29
Payer: MEDICARE

## 2020-08-25 PROCEDURE — 77063 BREAST TOMOSYNTHESIS BI: CPT

## 2020-08-31 ENCOUNTER — OFFICE VISIT (OUTPATIENT)
Dept: INTERNAL MEDICINE CLINIC | Age: 75
End: 2020-08-31
Payer: MEDICARE

## 2020-08-31 VITALS
TEMPERATURE: 96.8 F | HEIGHT: 67 IN | DIASTOLIC BLOOD PRESSURE: 78 MMHG | SYSTOLIC BLOOD PRESSURE: 132 MMHG | WEIGHT: 265 LBS | OXYGEN SATURATION: 99 % | BODY MASS INDEX: 41.59 KG/M2 | HEART RATE: 62 BPM

## 2020-08-31 DIAGNOSIS — E66.01 MORBID OBESITY (HCC): ICD-10-CM

## 2020-08-31 DIAGNOSIS — E03.9 ACQUIRED HYPOTHYROIDISM: ICD-10-CM

## 2020-08-31 DIAGNOSIS — Z13.1 DIABETES MELLITUS SCREENING: ICD-10-CM

## 2020-08-31 DIAGNOSIS — I10 ESSENTIAL HYPERTENSION, BENIGN: ICD-10-CM

## 2020-08-31 DIAGNOSIS — K05.10 GINGIVITIS: ICD-10-CM

## 2020-08-31 LAB
A/G RATIO: 1.3 (ref 1.1–2.2)
ALBUMIN SERPL-MCNC: 4.3 G/DL (ref 3.4–5)
ALP BLD-CCNC: 99 U/L (ref 40–129)
ALT SERPL-CCNC: 27 U/L (ref 10–40)
ANION GAP SERPL CALCULATED.3IONS-SCNC: 17 MMOL/L (ref 3–16)
AST SERPL-CCNC: 21 U/L (ref 15–37)
BASOPHILS ABSOLUTE: 0 K/UL (ref 0–0.2)
BASOPHILS RELATIVE PERCENT: 0.3 %
BILIRUB SERPL-MCNC: 0.9 MG/DL (ref 0–1)
BUN BLDV-MCNC: 23 MG/DL (ref 7–20)
CALCIUM SERPL-MCNC: 10.3 MG/DL (ref 8.3–10.6)
CHLORIDE BLD-SCNC: 99 MMOL/L (ref 99–110)
CHOLESTEROL, TOTAL: 184 MG/DL (ref 0–199)
CO2: 23 MMOL/L (ref 21–32)
CREAT SERPL-MCNC: 1 MG/DL (ref 0.6–1.2)
EOSINOPHILS ABSOLUTE: 0 K/UL (ref 0–0.6)
EOSINOPHILS RELATIVE PERCENT: 0.3 %
GFR AFRICAN AMERICAN: >60
GFR NON-AFRICAN AMERICAN: 54
GLOBULIN: 3.2 G/DL
GLUCOSE BLD-MCNC: 85 MG/DL (ref 70–99)
HCT VFR BLD CALC: 44 % (ref 36–48)
HDLC SERPL-MCNC: 91 MG/DL (ref 40–60)
HEMOGLOBIN: 14.3 G/DL (ref 12–16)
LDL CHOLESTEROL CALCULATED: 74 MG/DL
LYMPHOCYTES ABSOLUTE: 3.1 K/UL (ref 1–5.1)
LYMPHOCYTES RELATIVE PERCENT: 42.3 %
MCH RBC QN AUTO: 29.6 PG (ref 26–34)
MCHC RBC AUTO-ENTMCNC: 32.6 G/DL (ref 31–36)
MCV RBC AUTO: 91 FL (ref 80–100)
MONOCYTES ABSOLUTE: 0.7 K/UL (ref 0–1.3)
MONOCYTES RELATIVE PERCENT: 10.2 %
NEUTROPHILS ABSOLUTE: 3.4 K/UL (ref 1.7–7.7)
NEUTROPHILS RELATIVE PERCENT: 46.9 %
PDW BLD-RTO: 15.6 % (ref 12.4–15.4)
PLATELET # BLD: 299 K/UL (ref 135–450)
PMV BLD AUTO: 8 FL (ref 5–10.5)
POTASSIUM SERPL-SCNC: 4.1 MMOL/L (ref 3.5–5.1)
RBC # BLD: 4.84 M/UL (ref 4–5.2)
SODIUM BLD-SCNC: 139 MMOL/L (ref 136–145)
T4 FREE: 1.2 NG/DL (ref 0.9–1.8)
TOTAL PROTEIN: 7.5 G/DL (ref 6.4–8.2)
TRIGL SERPL-MCNC: 93 MG/DL (ref 0–150)
TSH SERPL DL<=0.05 MIU/L-ACNC: 3.2 UIU/ML (ref 0.27–4.2)
VLDLC SERPL CALC-MCNC: 19 MG/DL
WBC # BLD: 7.2 K/UL (ref 4–11)

## 2020-08-31 PROCEDURE — 99497 ADVNCD CARE PLAN 30 MIN: CPT | Performed by: INTERNAL MEDICINE

## 2020-08-31 PROCEDURE — G0439 PPPS, SUBSEQ VISIT: HCPCS | Performed by: INTERNAL MEDICINE

## 2020-08-31 RX ORDER — CLINDAMYCIN HYDROCHLORIDE 300 MG/1
300 CAPSULE ORAL 2 TIMES DAILY
Qty: 14 CAPSULE | Refills: 0 | Status: SHIPPED | OUTPATIENT
Start: 2020-08-31 | End: 2020-09-07

## 2020-08-31 ASSESSMENT — LIFESTYLE VARIABLES: HOW OFTEN DO YOU HAVE A DRINK CONTAINING ALCOHOL: 0

## 2020-08-31 ASSESSMENT — PATIENT HEALTH QUESTIONNAIRE - PHQ9
SUM OF ALL RESPONSES TO PHQ QUESTIONS 1-9: 0
SUM OF ALL RESPONSES TO PHQ QUESTIONS 1-9: 0

## 2020-08-31 NOTE — PATIENT INSTRUCTIONS
Personalized Preventive Plan for Louise Garber - 8/31/2020  Medicare offers a range of preventive health benefits. Some of the tests and screenings are paid in full while other may be subject to a deductible, co-insurance, and/or copay. Some of these benefits include a comprehensive review of your medical history including lifestyle, illnesses that may run in your family, and various assessments and screenings as appropriate. After reviewing your medical record and screening and assessments performed today your provider may have ordered immunizations, labs, imaging, and/or referrals for you. A list of these orders (if applicable) as well as your Preventive Care list are included within your After Visit Summary for your review. Other Preventive Recommendations:    · A preventive eye exam performed by an eye specialist is recommended every 1-2 years to screen for glaucoma; cataracts, macular degeneration, and other eye disorders. · A preventive dental visit is recommended every 6 months. · Try to get at least 150 minutes of exercise per week or 10,000 steps per day on a pedometer . · Order or download the FREE \"Exercise & Physical Activity: Your Everyday Guide\" from [employer]. Call 2-208.193.3419 or search [employer] online. · You need 2414-1549 mg of calcium and 1969-1156 IU of vitamin D per day. It is possible to meet your calcium requirement with diet alone, but a vitamin D supplement is usually necessary to meet this goal.  · When exposed to the sun, use a sunscreen that protects against both UVA and UVB radiation with an SPF of 30 or greater. Reapply every 2 to 3 hours or after sweating, drying off with a towel, or swimming. · Always wear a seat belt when traveling in a car. Always wear a helmet when riding a bicycle or motorcycle.

## 2020-08-31 NOTE — PROGRESS NOTES
Medicare Annual Wellness Visit  Name: Daniel Huynh Date: 2020   MRN: [id number] Sex: Female   Age: 76 y.o. Ethnicity: Non-/Non    : 1945 Race: Black      Vashti Hagen is here for Medicare AWV and Oral Swelling    Screenings for behavioral, psychosocial and functional/safety risks, and cognitive dysfunction are all negative except as indicated below. These results, as well as other patient data from the [address] form, are documented in Flowsheets linked to this Encounter. Allergies   Allergen Reactions    Oxycodone Nausea Only    Penicillins Nausea Only       Prior to Visit Medications    Medication Sig Taking?  Authorizing Provider   atorvastatin (LIPITOR) 20 MG tablet TAKE 1 TABLET DAILY Yes Cally Noriega MD   montelukast (SINGULAIR) 10 MG tablet TAKE 1 TABLET DAILY Yes Aubrey Portillo MD   famotidine (PEPCID) 20 MG tablet TAKE 1 TABLET NIGHTLY Yes Aubrey Portillo MD   pantoprazole (PROTONIX) 40 MG tablet Take 1 tablet by mouth 2 times daily (before meals) Yes Aubrey Portillo MD   losartan (COZAAR) 50 MG tablet TAKE 1 TABLET DAILY Yes Aubrey Portillo MD   omeprazole (PRILOSEC) 20 MG delayed release capsule TAKE 1 CAPSULE DAILY Yes Aubrey Portillo MD   mometasone (NASONEX) 50 MCG/ACT nasal spray 2 sprays by Nasal route daily Yes Aubrey Portillo MD   PROAIR  (87 Base) MCG/ACT inhaler USE 2 INHALATIONS EVERY 6 HOURS AS NEEDED FOR WHEEZING Yes Aubrey Portillo MD   escitalopram (LEXAPRO) 10 MG tablet TAKE 1 TABLET DAILY Yes Cally Noriega MD   amLODIPine (NORVASC) 10 MG tablet TAKE 1 TABLET DAILY Yes Cally Noriega MD   triamterene-hydrochlorothiazide (MAXZIDE-25) 37.5-25 MG per tablet TAKE 1 TABLET DAILY Yes Cally Noriega MD   dexlansoprazole (DEXILANT) 60 MG CPDR delayed release capsule Take 60 mg by mouth daily Yes Angy Justin MD   budesonide-formoterol (SYMBICORT) 160-4.5 MCG/ACT AERO Inhale 2 puffs into the lungs 2 times daily Yes Historical Provider, MD   hydrocortisone (ANUSOL-HC) 25 MG suppository Place 1 suppository rectally every 12 hours Yes Kavon Jose MD   Cholecalciferol (VITAMIN D) 2000 UNITS CAPS capsule Take by mouth Yes Historical Provider, MD   dorzolamide (TRUSOPT) 2 % ophthalmic solution Place 2 drops into both eyes 2 times daily Yes Johanna Fields MD   aspirin 325 MG tablet Take 325 mg by mouth daily. Yes Historical Provider, MD   B Complex-C-Folic Acid (VITABEE/C PO) Take 1 tablet by mouth. Yes Historical Provider, MD   FISH OIL Take 1,000 mg by mouth daily. Yes Historical Provider, MD   Calcium Carb-Cholecalciferol (CALCIUM 500 +D) 500-400 MG-UNIT TABS Take 2 tablets by mouth daily.    Yes Historical Provider, MD   albuterol (PROVENTIL) (2.5 MG/3ML) 0.083% nebulizer solution Take 3 mLs by nebulization once for 1 dose  Kavon Jose MD       Past Medical History:   Diagnosis Date    Asthma     Essential hypertension, benign 6/13/2011    Extrinsic asthma 6/13/2011    GERD (gastroesophageal reflux disease)     EGD DONE    Glaucoma (increased eye pressure) 2015    Hyperlipidemia     Mild episode of recurrent major depressive disorder ([facility] [address] 12/21/2016    Mixed hyperlipidemia 5/10/2016    Reactive airway disease        Past Surgical History:   Procedure Laterality Date    HYSTERECTOMY      PARTIAL HYSTERECTOMY         Family History   Problem Relation Age of Onset    Kidney Disease Mother     Diabetes Father     Coronary Art Dis Brother        CareTeam (Including outside providers/suppliers regularly involved in providing care):   Patient Care Team:  Johanna Fields MD as PCP - Alberto Talbert MD as PCP - [facility] Empaneled Provider  Jonas Rubin RN as [facility]    Wt Readings from Last 3 Encounters:   08/31/20 265 lb (120.2 kg)   03/30/20 263 lb (119.3 kg)   12/30/19 265 lb (120.2 kg)     Vitals:    08/31/20 0939   BP: 132/78   Pulse: 62 kg/m². Health Habits/Nutrition Interventions:  · Dental exam overdue:  patient encouraged to make appointment with his/her dentist    Personalized Preventive Plan   Current Health Maintenance Status  Immunization History   Administered Date(s) Administered    DT (pediatric) 01/17/2007    Influenza Whole 10/24/2009    Influenza, High Dose (Fluzone 65 yrs and older) 01/28/2015, 10/01/2016, 11/30/2017    Influenza, Triv, inactivated, subunit, adjuvanted, IM (Fluad 65 yrs and older) 10/02/2019    PPD Test 12/27/2010    Pneumococcal Conjugate 13-valent (Bqnywpw20) 10/26/2015    Pneumococcal Conjugate 7-valent (Prevnar7) 10/19/2010    Pneumococcal Polysaccharide (Fyfucfsgw96) 05/21/2014    Zoster Live (Zostavax) 10/24/2009    Zoster Recombinant (Shingrix) 09/08/2019        Health Maintenance   Topic Date Due    Annual Wellness Visit (AWV)  05/29/2019    A1C test (Diabetic or Prediabetic)  05/17/2020    Lipid screen  05/17/2020    Potassium monitoring  05/17/2020    Creatinine monitoring  05/17/2020    DTaP/Tdap/Td vaccine (2 - Tdap) 11/10/2021 (Originally 1/17/2017)    Flu vaccine (1) 09/01/2020    Breast cancer screen  08/25/2022    Colon cancer screen colonoscopy  09/05/2022    DEXA (modify frequency per FRAX score)  Completed    Shingles Vaccine  Completed    Pneumococcal 65+ years Vaccine  Completed    Hepatitis C screen  Completed    Hepatitis A vaccine  Aged Out    Hepatitis B vaccine  Aged Out    Hib vaccine  Aged Out    Meningococcal (ACWY) vaccine  Aged Out     Recommendations for [employer] Due: see orders and patient instructions/AVS.  . Recommended screening schedule for the next 5-10 years is provided to the patient in written form: see Patient Instructions/AVS.    Stew Alexsander was seen today for medicare awv and oral swelling.     Diagnoses and all orders for this visit:    Routine general medical examination at a health care facility  -      Amara Gaffney 1ST 30MIN [50248]    Essential hypertension, benign  -     OK ADVANCED CARE PLAN FACE TO FACE, 1ST 30MIN [[id number]]  -     Comprehensive Metabolic Panel; Future  -     CBC Auto Differential; Future  -     Lipid Panel; Future    Morbid obesity ([address]  -     OK ADVANCED CARE PLAN FACE TO FACE, 1ST 30MIN [[id number]]  -     T4, Free; Future  -     TSH without Reflex; Future    Acquired hypothyroidism  -     OK ADVANCED CARE PLAN FACE TO FACE, 1ST 30MIN [[id number]]  -     T4, Free; Future  -     TSH without Reflex; Future    Gastroesophageal reflux disease without esophagitis  -     OK ADVANCED CARE PLAN FACE TO FACE, 1ST 30MIN [[id number]]    Mild intermittent extrinsic asthma without complication  -     OK ADVANCED CARE PLAN FACE TO FACE, 1ST 30MIN [[id number]]    Gingivitis  -     OK ADVANCED CARE PLAN FACE TO FACE, 1ST 30MIN [[id number]]  -     CBC Auto Differential; Future  -     nystatin (MYCOSTATIN) 018817 UNIT/ML suspension; Take 5 mLs by mouth 4 times daily  -     clindamycin (CLEOCIN) 300 MG capsule; Take 1 capsule by mouth 2 times daily for 7 days    ACP (advance care planning)  -     OK ADVANCED CARE PLAN FACE TO FACE, 1ST 30MIN [[id number]]    Diabetes mellitus screening  -     Hemoglobin A1C; Future          Advance Care Planning     Advance Care Planning (ACP) Physician/NP/PA (Provider) Conversation      Date of ACP Conversation: 8/31/2020    Conversation Conducted with:   Patient with Decision Making [address] [place] [address] Decision Maker:    Current Designated Health Care Decision Maker:      Note: Assess and validate information in current ACP documents, as indicated. If no Authorized Decision Maker has previously been identified, then patient chooses Augie 8:  \"Who would you like to name as your primary health care decision-maker? \"             Name: Yash Bustillo        Relationship: Friend         Phone number: [phone]  Donna Adkins this person be reached easily? \" Yes  \"Who would you like to name as your back-up decision maker? \"   Name: Emy Pritchard        Relationship: son         Phone number: [phone]  Angela Bah this person be reached easily? \" Yes    Note: If the relationship of these Decision-Makers to the patient does NOT follow your state's Next of Kin hierarchy, recommend that patient complete ACP document that meets state-specific requirements to allow them to act on the patient's behalf when appropriate. Care Preferences:    Hospitalization: \"If your health worsens and it becomes clear that your chance of recovery is unlikely, what would your preference be regarding hospitalization? \"  If the patient would want hospitalization, answer \"yes\". If the patient would prefer comfort-focused treatment without hospitalization, answer \"no\". YES/NO/WILD CARDS: yes      Ventilation: \"If you were in your present state of health and suddenly became very ill and were unable to breathe on your own, what would your preference be about the use of a ventilator (breathing machine) if it was available to you? \"    If patient would desire the use of a ventilator (breathing machine), answer \"yes\", if not answer \"no\":yes    \"If your health worsens and it becomes clear that your chance of recovery is unlikely, what would your preference be about the use of a ventilator (breathing machine) if it was available to you? \"   yes    Resuscitation:  \"CPR works best to restart the heart when there is a sudden event, like a heart attack, in someone who is otherwise healthy. Unfortunately, CPR does not typically restart the heart for people who have serious health conditions or who are very sick. \"    \"In the event your heart stopped as a result of an underlying serious health condition, would you want attempts to be made to restart your heart (answer \"yes\" for attempt to resuscitate) or would you prefer a natural death (answer \"no\" for do not attempt to resuscitate)? \"   yes     NOTE: If the patient has a valid advance directive AND provides care preference(s) that are inconsistent with that prior directive, advise the patient to consider either: creating a new advance directive that complies with state-specific requirements; or, if that is not possible, orally revoking that prior directive in accordance with state-specific requirements, which must be documented in the EHR.     Conversation Outcomes / Follow-Up Plan:   Recommended completion of Advance Directive      Length of Voluntary ACP Conversation in minutes:  20 minutes    [facility] [address]

## 2020-09-01 LAB
ESTIMATED AVERAGE GLUCOSE: 116.9 MG/DL
HBA1C MFR BLD: 5.7 %

## 2020-11-20 ENCOUNTER — CARE COORDINATION (OUTPATIENT)
Dept: CARE COORDINATION | Age: 75
End: 2020-11-20

## 2021-01-08 RX ORDER — PANTOPRAZOLE SODIUM 40 MG/1
40 TABLET, DELAYED RELEASE ORAL
Qty: 60 TABLET | Refills: 0 | Status: SHIPPED | OUTPATIENT
Start: 2021-01-08 | End: 2021-05-19 | Stop reason: SDUPTHER

## 2021-01-08 RX ORDER — PANTOPRAZOLE SODIUM 40 MG/1
40 TABLET, DELAYED RELEASE ORAL
Qty: 180 TABLET | Refills: 0 | Status: SHIPPED | OUTPATIENT
Start: 2021-01-08 | End: 2021-01-08 | Stop reason: SDUPTHER

## 2021-01-08 NOTE — TELEPHONE ENCOUNTER
Please Advise    Medication pend in chart      LOV 8/31/2020    She would like [facility]

## 2021-01-22 DIAGNOSIS — I10 ESSENTIAL HYPERTENSION, BENIGN: ICD-10-CM

## 2021-01-23 RX ORDER — TRIAMTERENE AND HYDROCHLOROTHIAZIDE 37.5; 25 MG/1; MG/1
TABLET ORAL
Qty: 90 TABLET | Refills: 3 | Status: SHIPPED | OUTPATIENT
Start: 2021-01-23 | End: 2022-01-11

## 2021-01-23 RX ORDER — AMLODIPINE BESYLATE 10 MG/1
TABLET ORAL
Qty: 90 TABLET | Refills: 3 | Status: SHIPPED | OUTPATIENT
Start: 2021-01-23 | End: 2022-01-11

## 2021-02-02 ENCOUNTER — CARE COORDINATION (OUTPATIENT)
Dept: CARE COORDINATION | Age: 76
End: 2021-02-02

## 2021-02-02 NOTE — CARE COORDINATION
ACM:  Patient says she is working now, and she would like to follow up Thursday. Suggested patient call between 8:30-4:30. Please leave a message if I do not answer and ACM will call back.

## 2021-03-01 ENCOUNTER — OFFICE VISIT (OUTPATIENT)
Dept: INTERNAL MEDICINE CLINIC | Age: 76
End: 2021-03-01
Payer: MEDICARE

## 2021-03-01 VITALS
HEART RATE: 52 BPM | SYSTOLIC BLOOD PRESSURE: 126 MMHG | WEIGHT: 274 LBS | DIASTOLIC BLOOD PRESSURE: 72 MMHG | BODY MASS INDEX: 42.91 KG/M2 | TEMPERATURE: 96.2 F | OXYGEN SATURATION: 98 %

## 2021-03-01 DIAGNOSIS — K21.9 GASTROESOPHAGEAL REFLUX DISEASE WITHOUT ESOPHAGITIS: Chronic | ICD-10-CM

## 2021-03-01 DIAGNOSIS — F33.0 MILD EPISODE OF RECURRENT MAJOR DEPRESSIVE DISORDER (HCC): ICD-10-CM

## 2021-03-01 DIAGNOSIS — I10 ESSENTIAL HYPERTENSION, BENIGN: Primary | ICD-10-CM

## 2021-03-01 DIAGNOSIS — E66.01 MORBID OBESITY (HCC): ICD-10-CM

## 2021-03-01 DIAGNOSIS — E78.2 MIXED HYPERLIPIDEMIA: Chronic | ICD-10-CM

## 2021-03-01 PROCEDURE — 99214 OFFICE O/P EST MOD 30 MIN: CPT | Performed by: INTERNAL MEDICINE

## 2021-03-01 ASSESSMENT — PATIENT HEALTH QUESTIONNAIRE - PHQ9
SUM OF ALL RESPONSES TO PHQ9 QUESTIONS 1 & 2: 0
SUM OF ALL RESPONSES TO PHQ QUESTIONS 1-9: 0
SUM OF ALL RESPONSES TO PHQ QUESTIONS 1-9: 0

## 2021-03-01 NOTE — PROGRESS NOTES
Chief Complaint   Patient presents with    Hypertension       Vitals:    03/01/21 0958   BP: 126/72   Pulse: 52   Temp: 96.2 °F (35.7 °C)   SpO2: 98%     PHQ Scores 3/1/2021 8/31/2020 3/30/2020 8/30/2019 7/2/2018 7/29/2015   PHQ2 Score 0 0 0 1 0 0   PHQ9 Score 0 0 0 1 0 0     Interpretation of Total Score Depression Severity: 1-4 = Minimal depression, 5-9 = Mild depression, 10-14 = Moderate depression, 15-19 = Moderately severe depression, 20-27 = Severe depression    Physical Exam  Vitals signs reviewed. Constitutional:       General: She is not in acute distress. Appearance: She is well-developed. She is not diaphoretic. HENT:      Head: Normocephalic and atraumatic. Cardiovascular:      Rate and Rhythm: Normal rate and regular rhythm. Pulses: Normal pulses. Heart sounds: Normal heart sounds. No murmur. No friction rub. No gallop. Pulmonary:      Effort: Pulmonary effort is normal. No respiratory distress. Breath sounds: No stridor. No wheezing, rhonchi or rales. Chest:      Chest wall: No tenderness. Neurological:      Mental Status: She is alert and oriented to person, place, and time. Cranial Nerves: No cranial nerve deficit. Psychiatric:         Behavior: Behavior normal.         Thought Content: Thought content normal.         Judgment: Judgment normal.         Assessment/Plan:  Juliana was seen today for hypertension. Diagnoses and all orders for this visit:    Essential hypertension, benign  Comments:  Chronic, controlled. Mild episode of recurrent major depressive disorder (HCC)  Comments:  Chronic, controlled. Taking Lexapro. Morbid obesity ([address]  Comments:  Chronic, Unchanged.      Gastroesophageal reflux disease without esophagitis  Comments:  She does food avoidance and takes Omeprazole and Pepcid    Mixed hyperlipidemia  Comments:  Taking Lexapro      Kelli Crystal MD  [facility]

## 2021-04-14 ENCOUNTER — CARE COORDINATION (OUTPATIENT)
Dept: CARE COORDINATION | Age: 76
End: 2021-04-14

## 2021-04-15 DIAGNOSIS — F33.0 MILD EPISODE OF RECURRENT MAJOR DEPRESSIVE DISORDER (HCC): ICD-10-CM

## 2021-04-16 RX ORDER — ESCITALOPRAM OXALATE 10 MG/1
TABLET ORAL
Qty: 90 TABLET | Refills: 3 | Status: SHIPPED | OUTPATIENT
Start: 2021-04-16 | End: 2022-04-11

## 2021-05-19 RX ORDER — PANTOPRAZOLE SODIUM 40 MG/1
40 TABLET, DELAYED RELEASE ORAL
Qty: 180 TABLET | Refills: 1 | Status: SHIPPED | OUTPATIENT
Start: 2021-05-19 | End: 2021-12-13 | Stop reason: SDUPTHER

## 2021-05-19 NOTE — TELEPHONE ENCOUNTER
Not clear on pharmacy that script is needing to go to. Script was pended for a 30 day supply with a mail order pharmacy. Sent to mail order pharmacy as last scripts were sent there.

## 2021-09-01 ENCOUNTER — OFFICE VISIT (OUTPATIENT)
Dept: INTERNAL MEDICINE CLINIC | Age: 76
End: 2021-09-01
Payer: MEDICARE

## 2021-09-01 VITALS
BODY MASS INDEX: 42.06 KG/M2 | RESPIRATION RATE: 16 BRPM | HEART RATE: 54 BPM | TEMPERATURE: 97 F | OXYGEN SATURATION: 98 % | DIASTOLIC BLOOD PRESSURE: 90 MMHG | HEIGHT: 67 IN | WEIGHT: 268 LBS | SYSTOLIC BLOOD PRESSURE: 150 MMHG

## 2021-09-01 DIAGNOSIS — E66.01 MORBID OBESITY (HCC): Chronic | ICD-10-CM

## 2021-09-01 DIAGNOSIS — Z00.00 ROUTINE GENERAL MEDICAL EXAMINATION AT A HEALTH CARE FACILITY: Primary | ICD-10-CM

## 2021-09-01 DIAGNOSIS — Z13.1 DIABETES MELLITUS SCREENING: ICD-10-CM

## 2021-09-01 DIAGNOSIS — H90.0 CONDUCTIVE HEARING LOSS, BILATERAL: ICD-10-CM

## 2021-09-01 DIAGNOSIS — E78.2 MIXED HYPERLIPIDEMIA: Chronic | ICD-10-CM

## 2021-09-01 DIAGNOSIS — R00.1 BRADYCARDIA: ICD-10-CM

## 2021-09-01 DIAGNOSIS — I10 ESSENTIAL HYPERTENSION, BENIGN: ICD-10-CM

## 2021-09-01 PROCEDURE — G0439 PPPS, SUBSEQ VISIT: HCPCS | Performed by: INTERNAL MEDICINE

## 2021-09-01 RX ORDER — LOSARTAN POTASSIUM 100 MG/1
100 TABLET ORAL DAILY
Qty: 90 TABLET | Refills: 3 | Status: SHIPPED | OUTPATIENT
Start: 2021-09-01 | End: 2022-07-25

## 2021-09-01 SDOH — ECONOMIC STABILITY: FOOD INSECURITY: WITHIN THE PAST 12 MONTHS, THE FOOD YOU BOUGHT JUST DIDN'T LAST AND YOU DIDN'T HAVE MONEY TO GET MORE.: NEVER TRUE

## 2021-09-01 SDOH — ECONOMIC STABILITY: FOOD INSECURITY: WITHIN THE PAST 12 MONTHS, YOU WORRIED THAT YOUR FOOD WOULD RUN OUT BEFORE YOU GOT MONEY TO BUY MORE.: NEVER TRUE

## 2021-09-01 ASSESSMENT — SOCIAL DETERMINANTS OF HEALTH (SDOH): HOW HARD IS IT FOR YOU TO PAY FOR THE VERY BASICS LIKE FOOD, HOUSING, MEDICAL CARE, AND HEATING?: NOT HARD AT ALL

## 2021-09-01 ASSESSMENT — PATIENT HEALTH QUESTIONNAIRE - PHQ9
SUM OF ALL RESPONSES TO PHQ QUESTIONS 1-9: 0
SUM OF ALL RESPONSES TO PHQ QUESTIONS 1-9: 0
SUM OF ALL RESPONSES TO PHQ9 QUESTIONS 1 & 2: 0
2. FEELING DOWN, DEPRESSED OR HOPELESS: 0
SUM OF ALL RESPONSES TO PHQ QUESTIONS 1-9: 0
1. LITTLE INTEREST OR PLEASURE IN DOING THINGS: 0

## 2021-09-01 ASSESSMENT — LIFESTYLE VARIABLES: HOW OFTEN DO YOU HAVE A DRINK CONTAINING ALCOHOL: 0

## 2021-09-01 NOTE — PROGRESS NOTES
Medicare Annual Wellness Visit  Name: Iram Culp Date: 2021   MRN: [id number] Sex: Female   Age: 76 y.o. Ethnicity: Non- / Non    : 1945 Race: Black / African American      Rohan Hull is here for Medicare AWV (Annual Medicare well visit )    Screenings for behavioral, psychosocial and functional/safety risks, and cognitive dysfunction are all negative except as indicated below. These results, as well as other patient data from the [address] [facility] [address] form, are documented in Flowsheets linked to this Encounter. Allergies   Allergen Reactions    Oxycodone Nausea Only    Penicillins Nausea Only       Prior to Visit Medications    Medication Sig Taking?  Authorizing Provider   famotidine (PEPCID) 20 MG tablet TAKE 1 TABLET NIGHTLY Yes Deshawn Caicedo MD   montelukast (SINGULAIR) 10 MG tablet TAKE 1 TABLET DAILY Yes Deshawn Caicedo MD   losartan (COZAAR) 50 MG tablet TAKE 1 TABLET DAILY Yes Deshawn Caicedo MD   atorvastatin (LIPITOR) 20 MG tablet TAKE 1 TABLET DAILY Yes Arun Ely MD   escitalopram (LEXAPRO) 10 MG tablet TAKE 1 TABLET DAILY Yes Arun Ely MD   albuterol sulfate  (90 Base) MCG/ACT inhaler USE 2 INHALATIONS EVERY 6 HOURS AS NEEDED FOR WHEEZING Yes Deshawn Caicedo MD   triamterene-hydroCHLOROthiazide (NHMTFIT-54) 37.5-25 MG per tablet TAKE 1 TABLET DAILY Yes Arun Ely MD   amLODIPine (NORVASC) 10 MG tablet TAKE 1 TABLET DAILY Yes Arun Ely MD   mometasone (NASONEX) 50 MCG/ACT nasal spray 2 sprays by Nasal route daily Yes Deshawn Caicedo MD   dexlansoprazole (DEXILANT) 60 MG CPDR delayed release capsule Take 60 mg by mouth daily Yes Historical MD Nhan   albuterol (PROVENTIL) (2.5 MG/3ML) 0.083% nebulizer solution Take 3 mLs by nebulization once for 1 dose Yes Arun Ely MD   budesonide-formoterol (SYMBICORT) 160-4.5 MCG/ACT AERO Inhale 2 puffs into the lungs 2 times daily Yes Historical Provider, MD   Cholecalciferol (VITAMIN D) 2000 UNITS CAPS capsule Take by mouth Yes Historical Provider, MD   dorzolamide (TRUSOPT) 2 % ophthalmic solution Place 2 drops into both eyes 2 times daily Yes Roz Ricks MD   aspirin 325 MG tablet Take 325 mg by mouth daily. Yes Historical Provider, MD   B Complex-C-Folic Acid (VITABEE/C PO) Take 1 tablet by mouth. Yes Historical Provider, MD   Calcium Carb-Cholecalciferol (CALCIUM 500 +D) 500-400 MG-UNIT TABS Take 2 tablets by mouth daily. Yes Historical Provider, MD   pantoprazole (PROTONIX) 40 MG tablet Take 1 tablet by mouth 2 times daily (before meals)  Roz Ricks MD   nystatin (MYCOSTATIN) 286158 UNIT/ML suspension Take 5 mLs by mouth 4 times daily  Roz Ricks MD   omeprazole (PRILOSEC) 20 MG delayed release capsule TAKE 1 CAPSULE DAILY  Patient not taking: Reported on 9/1/2021  Roz Ricks MD   hydrocortisone (ANUSOL-HC) 25 MG suppository Place 1 suppository rectally every 12 hours  Gianna Alston MD   [facility] OIL Take 1,000 mg by mouth daily.     Patient not taking: Reported on 9/1/2021  Historical Provider, MD       Past Medical History:   Diagnosis Date    Asthma     Essential hypertension, benign 6/13/2011    Extrinsic asthma 6/13/2011    GERD (gastroesophageal reflux disease)     EGD DONE    Glaucoma (increased eye pressure) 2015    Hyperlipidemia     Mild episode of recurrent major depressive disorder ([facility] [address] 12/21/2016    Mixed hyperlipidemia 5/10/2016    Reactive airway disease        Past Surgical History:   Procedure Laterality Date    HYSTERECTOMY      PARTIAL HYSTERECTOMY         Family History   Problem Relation Age of Onset    Kidney Disease Mother     Diabetes Father     Coronary Art Dis Brother        CareTeam (Including outside providers/suppliers regularly involved in providing care):   Patient Care Team:  Roz Ricks MD as PCP - Lawyer Trish MD as PCP - [facility] Provider    Wt Readings from Last 3 Encounters:   09/01/21 268 lb (121.6 kg)   03/01/21 274 lb (124.3 kg)   08/31/20 265 lb (120.2 kg)     Vitals:    09/01/21 0952 09/01/21 1002   BP: (!) 154/90 (!) 150/90   Pulse: 50 54   Resp: 16    Temp: 97 °F (36.1 °C)    TempSrc: Temporal    SpO2: 98%    Weight: 268 lb (121.6 kg)    Height: 5' 7\" (1.702 m)      Body mass index is 41.97 kg/m². Based upon direct observation of the patient, evaluation of cognition reveals recent and remote memory intact. Physical Exam  Vitals reviewed. Constitutional:       General: She is not in acute distress. Appearance: She is well-developed. She is not diaphoretic. HENT:      Head: Normocephalic and atraumatic. Pulmonary:      Effort: Pulmonary effort is normal.   Neurological:      Mental Status: She is alert and oriented to person, place, and time. Cranial Nerves: No cranial nerve deficit. Psychiatric:         Behavior: Behavior normal.         Thought Content: Thought content normal.         Judgment: Judgment normal.           Patient's complete Health Risk Assessment and screening values have been reviewed and are found in Flowsheets. The following problems were reviewed today and where indicated follow up appointments were made and/or referrals ordered.     Positive Risk Factor Screenings with Interventions:           Health Habits/Nutrition:  Health Habits/Nutrition  Do you exercise for at least 20 minutes 2-3 times per week?: Yes  Have you lost any weight without trying in the past 3 months?: No  Do you eat only one meal per day?: No  Have you seen the dentist within the past year?: Yes  Body mass index: (!) 41.97  Health Habits/Nutrition Interventions:  · Nutritional issues:  nutrition counseling referral ordered    Hearing/Vision:  No exam data present  Hearing/Vision  Do you or your family notice any trouble with your hearing that hasn't been managed with hearing aids?: (!) Yes  Do you have difficulty driving, Completed    Shingles Vaccine  Completed    Pneumococcal 65+ years Vaccine  Completed    COVID-19 Vaccine  Completed    Hepatitis C screen  Completed    Hepatitis A vaccine  Aged Out    Hepatitis B vaccine  Aged Out    Hib vaccine  Aged Out    Meningococcal (ACWY) vaccine  Aged Out     Recommendations for STARFACE Due: see orders and patient instructions/AVS.  . Recommended screening schedule for the next 5-10 years is provided to the patient in written form: see Patient Joseph Martin was seen today for medicare aw. Diagnoses and all orders for this visit:    Routine general medical examination at a health care facility    Morbid obesity ([facility] [address]  -     T4, Free; Future  -     TSH without Reflex; Future    Essential hypertension, benign  -     Comprehensive Metabolic Panel; Future  -     CBC Auto Differential; Future  -     losartan (COZAAR) 100 MG tablet; Take 1 tablet by mouth daily    Mixed hyperlipidemia  -     Lipid Panel;  Future    Diabetes mellitus screening  -     Hemoglobin A1C; Future    Conductive hearing loss, bilateral  -     Ava - Buzz Espinoza DO, Otolaryngology, [facility]    Bradycardia  Comments:  Observation           Sumi Bolton MD  [address]

## 2021-09-01 NOTE — PATIENT INSTRUCTIONS
Personalized Preventive Plan for Linda Mccarty - 9/1/2021  Medicare offers a range of preventive health benefits. Some of the tests and screenings are paid in full while other may be subject to a deductible, co-insurance, and/or copay. Some of these benefits include a comprehensive review of your medical history including lifestyle, illnesses that may run in your family, and various assessments and screenings as appropriate. After reviewing your medical record and screening and assessments performed today your provider may have ordered immunizations, labs, imaging, and/or referrals for you. A list of these orders (if applicable) as well as your Preventive Care list are included within your After Visit Summary for your review. Other Preventive Recommendations:    · A preventive eye exam performed by an eye specialist is recommended every 1-2 years to screen for glaucoma; cataracts, macular degeneration, and other eye disorders. · A preventive dental visit is recommended every 6 months. · Try to get at least 150 minutes of exercise per week or 10,000 steps per day on a pedometer . · Order or download the FREE \"Exercise & Physical Activity: Your Everyday Guide\" from [employer]. Call 1-160.167.6653 or search [employer] online. · You need 8967-8361 mg of calcium and 7860-8031 IU of vitamin D per day. It is possible to meet your calcium requirement with diet alone, but a vitamin D supplement is usually necessary to meet this goal.  · When exposed to the sun, use a sunscreen that protects against both UVA and UVB radiation with an SPF of 30 or greater. Reapply every 2 to 3 hours or after sweating, drying off with a towel, or swimming. · Always wear a seat belt when traveling in a car. Always wear a helmet when riding a bicycle or motorcycle.

## 2021-10-04 ENCOUNTER — OFFICE VISIT (OUTPATIENT)
Dept: ENT CLINIC | Age: 76
End: 2021-10-04
Payer: MEDICARE

## 2021-10-04 VITALS — TEMPERATURE: 97.9 F

## 2021-10-04 DIAGNOSIS — L30.9 DERMATITIS OF EXTERNAL EAR: ICD-10-CM

## 2021-10-04 DIAGNOSIS — J30.9 ALLERGIC RHINITIS, UNSPECIFIED SEASONALITY, UNSPECIFIED TRIGGER: ICD-10-CM

## 2021-10-04 DIAGNOSIS — H93.12 TINNITUS OF LEFT EAR: Primary | ICD-10-CM

## 2021-10-04 DIAGNOSIS — R42 POSITIONAL LIGHTHEADEDNESS: ICD-10-CM

## 2021-10-04 DIAGNOSIS — J34.3 HYPERTROPHY OF BOTH INFERIOR NASAL TURBINATES: ICD-10-CM

## 2021-10-04 PROCEDURE — 99204 OFFICE O/P NEW MOD 45 MIN: CPT | Performed by: STUDENT IN AN ORGANIZED HEALTH CARE EDUCATION/TRAINING PROGRAM

## 2021-10-04 RX ORDER — FLUOCINOLONE ACETONIDE 0.11 MG/ML
3 OIL AURICULAR (OTIC) 2 TIMES DAILY PRN
Qty: 1 EACH | Refills: 3 | Status: SHIPPED | OUTPATIENT
Start: 2021-10-04

## 2021-10-04 NOTE — PROGRESS NOTES
[facility] PATIENT HISTORY AND PHYSICAL NOTE      Patient Name: Marguerite Romero Record Number:  [id number]  Primary Care Physician:  Didier Marrero MD    ChiefComplaint     Chief Complaint   Patient presents with    Ear Problem     patient states her left ear has been bothering her, occasionallly itchy, ears are sensitive to noise, occasional pain of left ear. History of Present Illness     Garlon Kayser is an 76 y.o. female presenting with left ear issues. Intermittent left tinnitus, non pulsatile, high pitched. Denies hearing loss. Occasional mild left otalgia. Left ear will itch often, occasionally on the right. No Q-tip use. No otorrhea. No history of chronic ear infections. No history of otologic surgery. No family history of early onset hearing loss. No loud noise exposures. Denies exposure to chemotherapy or long-term IV antibiotics. Denies vertigo or dizziness. No recent audiogram.     + allergies. On singulair and nasonex. Takes somewhat as needed, used majority of days. When she gets up quickly will occasionally get lightheaded. No room spinning dizziness, no associated nausea. Occurs every 6 months or so, lasts only for a few seconds before she gets her bearings back. No syncope.      Past Medical History     Past Medical History:   Diagnosis Date    Asthma     Essential hypertension, benign 6/13/2011    Extrinsic asthma 6/13/2011    GERD (gastroesophageal reflux disease)     EGD DONE    Glaucoma (increased eye pressure) 2015    Hyperlipidemia     Mild episode of recurrent major depressive disorder ([facility] [address] 12/21/2016    Mixed hyperlipidemia 5/10/2016    Reactive airway disease        Past Surgical History     Past Surgical History:   Procedure Laterality Date    HYSTERECTOMY      PARTIAL HYSTERECTOMY         Family History     Family History   Problem Relation Age of Onset    Kidney Disease Mother     Diabetes 2000 UNITS CAPS capsule Take by mouth      dorzolamide (TRUSOPT) 2 % ophthalmic solution Place 2 drops into both eyes 2 times daily 10 mL 11    aspirin 325 MG tablet Take 325 mg by mouth daily.  B Complex-C-Folic Acid (VITABEE/C PO) Take 1 tablet by mouth.  FISH OIL Take 1,000 mg by mouth daily        Calcium Carb-Cholecalciferol (CALCIUM 500 +D) 500-400 MG-UNIT TABS Take 2 tablets by mouth daily.  albuterol (PROVENTIL) (2.5 MG/3ML) 0.083% nebulizer solution Take 3 mLs by nebulization once for 1 dose 25 vial 2     No current facility-administered medications for this visit. Review of Systems     REVIEW OF SYSTEMS  The following systems were reviewed and revealed the following in addition to any already discussed in the HPI:    CONSTITUTIONAL: no weight loss, no fever, no night sweats, no chills  EYES: no vision changes, no blurry vision  EARS: no hearing loss, no otalgia  NOSE: no epistaxis, no rhinorrhea  THROAT: No voice changes, no sore throat, no dysphagia    PhysicalExam     Vitals:    10/04/21 1005   Temp: 97.9 °F (36.6 °C)   TempSrc: Temporal       PHYSICAL EXAM  Temp 97.9 °F (36.6 °C) (Temporal)     GENERAL: No acute distress, alert and oriented, no hoarseness  EYES: EOMI, Anti-icteric  NOSE: On anterior rhinoscopy there is no epistaxis, nasal mucosa moist and normal appearing, no purulent drainage. Inferior turbinates hypertrophied bilaterally. EARS: Normal external appearance; on portable otomicroscopy:     -Ad: External auditory canal without stenosis, tympanic membrane clear, no middle ear effusions or retractions.      -As: External auditory canal without stenosis, tympanic membrane clear, no middle ear effusions or retractions.    Pneumatic otoscopy: Bilateral tympanic membranes mobile pneumatic otoscopy  FACE: HB 1/6 bilaterally, symmetric appearing, sensation equal bilaterally  ORAL CAVITY: No masses or lesions visualized or palpated, uvula is midline, moist mucous

## 2021-12-13 ENCOUNTER — TELEPHONE (OUTPATIENT)
Dept: INTERNAL MEDICINE CLINIC | Age: 76
End: 2021-12-13

## 2021-12-13 RX ORDER — PANTOPRAZOLE SODIUM 40 MG/1
40 TABLET, DELAYED RELEASE ORAL
Qty: 180 TABLET | Refills: 1 | Status: SHIPPED | OUTPATIENT
Start: 2021-12-13

## 2021-12-13 NOTE — TELEPHONE ENCOUNTER
She had called back again and  did advise her to get tested for Covid-19. Do you want to do a virtual today or wait for results?

## 2021-12-13 NOTE — TELEPHONE ENCOUNTER
Patient called to have the following refilled:  pantoprazole (PROTONIX) 40 MG tablet   2x daily  90 day supply    Phone: [phone]

## 2021-12-13 NOTE — TELEPHONE ENCOUNTER
----- Message from Yuri Walls sent at 12/13/2021  8:05 AM EST -----  Subject: Appointment Request    Reason for Call: Urgent Cough Cold    QUESTIONS  Type of Appointment? Established Patient  Reason for appointment request? Available appointments did not meet   patient need  Additional Information for Provider? Pt calling in b/c she has not been   feeling well since last week. Pt says this week feels better but there is   a lingering congestion in her chest where she is coughing up   yellow/greenish phlegm pt says she would like to be seen today or either   something called in so she can start to feel better. She doesn't want this   to turn into Pneumonia. Would like someone from office to call her back   right away  ---------------------------------------------------------------------------  --------------  [address]  What is the best way for the office to contact you? OK to leave message on   voicemail  Preferred Call Back Phone Number?  [phone]  ---------------------------------------------------------------------------  --------------  SCRIPT ANSWERS

## 2021-12-13 NOTE — TELEPHONE ENCOUNTER
Patient called in regarding the following:  -cough  -with greenish/ yellowing mucus    Patient has been advised to get covid test  -call to schedule an appointment if test is negative    [phone]

## 2022-01-11 DIAGNOSIS — I10 ESSENTIAL HYPERTENSION, BENIGN: ICD-10-CM

## 2022-01-11 RX ORDER — TRIAMTERENE AND HYDROCHLOROTHIAZIDE 37.5; 25 MG/1; MG/1
TABLET ORAL
Qty: 90 TABLET | Refills: 3 | Status: SHIPPED | OUTPATIENT
Start: 2022-01-11

## 2022-01-11 RX ORDER — AMLODIPINE BESYLATE 10 MG/1
TABLET ORAL
Qty: 90 TABLET | Refills: 3 | Status: SHIPPED | OUTPATIENT
Start: 2022-01-11

## 2022-01-11 NOTE — TELEPHONE ENCOUNTER
Requested Prescriptions     Pending Prescriptions Disp Refills    amLODIPine (NORVASC) 10 MG tablet [Pharmacy Med Name: AMLODIPINE BESYLATE TABS 10MG] 90 tablet 3     Sig: TAKE 1 TABLET DAILY    triamterene-hydroCHLOROthiazide (MAXZIDE-25) 37.5-25 MG per tablet [Pharmacy Med Name: TRIAMTERENE/HYDROCHLOROTHIAZIDE TAB 37.5/25MG] 90 tablet 3     Sig: TAKE 1 TABLET DAILY   Please review the pending medication refill.     Patient was last seen 09/01/2021    Next office visit is 09/02/2022

## 2022-04-04 RX ORDER — ALBUTEROL SULFATE 90 UG/1
AEROSOL, METERED RESPIRATORY (INHALATION)
Qty: 1 EACH | Refills: 2 | Status: SHIPPED | OUTPATIENT
Start: 2022-04-04

## 2022-04-11 DIAGNOSIS — F33.0 MILD EPISODE OF RECURRENT MAJOR DEPRESSIVE DISORDER (HCC): ICD-10-CM

## 2022-04-11 RX ORDER — ESCITALOPRAM OXALATE 10 MG/1
TABLET ORAL
Qty: 90 TABLET | Refills: 0 | Status: SHIPPED | OUTPATIENT
Start: 2022-04-11 | End: 2022-07-11

## 2022-04-14 NOTE — TELEPHONE ENCOUNTER
Requested Prescriptions     Pending Prescriptions Disp Refills    atorvastatin (LIPITOR) 20 MG tablet [Pharmacy Med Name: ATORVASTATIN TABS 20MG] 90 tablet 3     Sig: TAKE 1 TABLET DAILY     Please review the pending medication refill.     Patient was last seen 09/01/2021    Next office visit is 09/02/2022    atorvastatin (LIPITOR) 20 MG tablet [[id number]]     Order Details  Dose, Route, Frequency: As Directed   Dispense Quantity: 90 tablet Refills: 1          Sig: TAKE 1 TABLET DAILY         Start Date: 10/19/21

## 2022-04-15 RX ORDER — ATORVASTATIN CALCIUM 20 MG/1
TABLET, FILM COATED ORAL
Qty: 90 TABLET | Refills: 1 | Status: SHIPPED | OUTPATIENT
Start: 2022-04-15 | End: 2022-10-11

## 2022-05-30 DIAGNOSIS — J45.20 MILD INTERMITTENT EXTRINSIC ASTHMA WITHOUT COMPLICATION: ICD-10-CM

## 2022-05-30 RX ORDER — FAMOTIDINE 20 MG/1
TABLET, FILM COATED ORAL
Qty: 90 TABLET | Refills: 0 | Status: SHIPPED | OUTPATIENT
Start: 2022-05-30 | End: 2022-08-26

## 2022-05-30 RX ORDER — MONTELUKAST SODIUM 10 MG/1
TABLET ORAL
Qty: 90 TABLET | Refills: 0 | Status: SHIPPED | OUTPATIENT
Start: 2022-05-30 | End: 2022-08-26

## 2022-06-18 ENCOUNTER — HOSPITAL ENCOUNTER (OUTPATIENT)
Dept: MAMMOGRAPHY | Age: 77
Discharge: HOME | End: 2022-06-18
Payer: MEDICARE

## 2022-06-18 VITALS — WEIGHT: 257 LBS | HEIGHT: 67 IN | BODY MASS INDEX: 40.34 KG/M2

## 2022-06-18 DIAGNOSIS — Z12.31 VISIT FOR SCREENING MAMMOGRAM: ICD-10-CM

## 2022-06-18 PROCEDURE — 77063 BREAST TOMOSYNTHESIS BI: CPT

## 2022-07-11 DIAGNOSIS — F33.0 MILD EPISODE OF RECURRENT MAJOR DEPRESSIVE DISORDER (HCC): ICD-10-CM

## 2022-07-11 RX ORDER — ESCITALOPRAM OXALATE 10 MG/1
TABLET ORAL
Qty: 90 TABLET | Refills: 3 | Status: SHIPPED | OUTPATIENT
Start: 2022-07-11

## 2022-07-11 NOTE — TELEPHONE ENCOUNTER
Requested Prescriptions     Pending Prescriptions Disp Refills    escitalopram (LEXAPRO) 10 MG tablet [Pharmacy Med Name: ESCITALOPRAM TABS 10MG] 90 tablet 3     Sig: TAKE 1 TABLET DAILY     Recent Visits  Date Type Provider Dept   09/01/21 Office Visit Vashti West MD [facility]   03/01/21 Office Visit Vashti West MD [facility]   Showing recent visits within past 540 days with a meds authorizing provider and meeting all other requirements  Future Appointments  Date Type Provider Dept   09/02/22 Appointment Vashti West MD [facility]   Showing future appointments within next 150 days with a meds authorizing provider and meeting all other requirements       LAST APPOINTMENT  9/1/2021       LAST REFILL ON MEDICATION  escitalopram (LEXAPRO) 10 MG tablet [[id number]]     Order Details  Dose, Route, Frequency: As Directed   Dispense Quantity: 90 tablet Refills: 0          Sig: TAKE 1 TABLET DAILY         Start Date: 04/11/22

## 2022-07-25 DIAGNOSIS — I10 ESSENTIAL HYPERTENSION, BENIGN: ICD-10-CM

## 2022-07-25 RX ORDER — LOSARTAN POTASSIUM 100 MG/1
TABLET ORAL
Qty: 90 TABLET | Refills: 3 | Status: SHIPPED | OUTPATIENT
Start: 2022-07-25

## 2022-08-26 DIAGNOSIS — J45.20 MILD INTERMITTENT EXTRINSIC ASTHMA WITHOUT COMPLICATION: ICD-10-CM

## 2022-08-26 RX ORDER — MONTELUKAST SODIUM 10 MG/1
TABLET ORAL
Qty: 90 TABLET | Refills: 1 | Status: SHIPPED | OUTPATIENT
Start: 2022-08-26

## 2022-08-26 RX ORDER — FAMOTIDINE 20 MG/1
TABLET, FILM COATED ORAL
Qty: 90 TABLET | Refills: 1 | Status: SHIPPED | OUTPATIENT
Start: 2022-08-26

## 2022-09-02 ENCOUNTER — OFFICE VISIT (OUTPATIENT)
Dept: INTERNAL MEDICINE CLINIC | Age: 77
End: 2022-09-02
Payer: MEDICARE

## 2022-09-02 VITALS
TEMPERATURE: 97.1 F | BODY MASS INDEX: 40.65 KG/M2 | WEIGHT: 259 LBS | HEART RATE: 57 BPM | OXYGEN SATURATION: 98 % | SYSTOLIC BLOOD PRESSURE: 132 MMHG | DIASTOLIC BLOOD PRESSURE: 72 MMHG | HEIGHT: 67 IN

## 2022-09-02 DIAGNOSIS — E66.01 MORBID OBESITY (HCC): Chronic | ICD-10-CM

## 2022-09-02 DIAGNOSIS — J45.20 MILD INTERMITTENT EXTRINSIC ASTHMA WITHOUT COMPLICATION: ICD-10-CM

## 2022-09-02 DIAGNOSIS — I10 ESSENTIAL HYPERTENSION, BENIGN: ICD-10-CM

## 2022-09-02 DIAGNOSIS — Z00.00 MEDICARE ANNUAL WELLNESS VISIT, SUBSEQUENT: Primary | ICD-10-CM

## 2022-09-02 DIAGNOSIS — F33.0 MILD EPISODE OF RECURRENT MAJOR DEPRESSIVE DISORDER (HCC): ICD-10-CM

## 2022-09-02 LAB
A/G RATIO: 1.5 (ref 1.1–2.2)
ALBUMIN SERPL-MCNC: 4.3 G/DL (ref 3.4–5)
ALP BLD-CCNC: 69 U/L (ref 40–129)
ALT SERPL-CCNC: 16 U/L (ref 10–40)
ANION GAP SERPL CALCULATED.3IONS-SCNC: 13 MMOL/L (ref 3–16)
AST SERPL-CCNC: 23 U/L (ref 15–37)
BASOPHILS ABSOLUTE: 0 K/UL (ref 0–0.2)
BASOPHILS RELATIVE PERCENT: 0.5 %
BILIRUB SERPL-MCNC: 0.7 MG/DL (ref 0–1)
BUN BLDV-MCNC: 24 MG/DL (ref 7–20)
CALCIUM SERPL-MCNC: 10.1 MG/DL (ref 8.3–10.6)
CHLORIDE BLD-SCNC: 102 MMOL/L (ref 99–110)
CHOLESTEROL, FASTING: 163 MG/DL (ref 0–199)
CO2: 23 MMOL/L (ref 21–32)
CREAT SERPL-MCNC: 0.9 MG/DL (ref 0.6–1.2)
EOSINOPHILS ABSOLUTE: 0.2 K/UL (ref 0–0.6)
EOSINOPHILS RELATIVE PERCENT: 2.8 %
GFR AFRICAN AMERICAN: >60
GFR NON-AFRICAN AMERICAN: >60
GLUCOSE BLD-MCNC: 91 MG/DL (ref 70–99)
HCT VFR BLD CALC: 35.8 % (ref 36–48)
HDLC SERPL-MCNC: 83 MG/DL (ref 40–60)
HEMOGLOBIN: 11.8 G/DL (ref 12–16)
LDL CHOLESTEROL CALCULATED: 66 MG/DL
LYMPHOCYTES ABSOLUTE: 3.1 K/UL (ref 1–5.1)
LYMPHOCYTES RELATIVE PERCENT: 56.3 %
MCH RBC QN AUTO: 29.8 PG (ref 26–34)
MCHC RBC AUTO-ENTMCNC: 33.1 G/DL (ref 31–36)
MCV RBC AUTO: 90.2 FL (ref 80–100)
MONOCYTES ABSOLUTE: 0.5 K/UL (ref 0–1.3)
MONOCYTES RELATIVE PERCENT: 9.9 %
NEUTROPHILS ABSOLUTE: 1.7 K/UL (ref 1.7–7.7)
NEUTROPHILS RELATIVE PERCENT: 30.5 %
PDW BLD-RTO: 15.7 % (ref 12.4–15.4)
PLATELET # BLD: 314 K/UL (ref 135–450)
PMV BLD AUTO: 7.9 FL (ref 5–10.5)
POTASSIUM SERPL-SCNC: 4.4 MMOL/L (ref 3.5–5.1)
RBC # BLD: 3.96 M/UL (ref 4–5.2)
SODIUM BLD-SCNC: 138 MMOL/L (ref 136–145)
T4 FREE: 1 NG/DL (ref 0.9–1.8)
TOTAL PROTEIN: 7.1 G/DL (ref 6.4–8.2)
TRIGLYCERIDE, FASTING: 69 MG/DL (ref 0–150)
TSH SERPL DL<=0.05 MIU/L-ACNC: 4.22 UIU/ML (ref 0.27–4.2)
VLDLC SERPL CALC-MCNC: 14 MG/DL
WBC # BLD: 5.5 K/UL (ref 4–11)

## 2022-09-02 PROCEDURE — G0439 PPPS, SUBSEQ VISIT: HCPCS | Performed by: INTERNAL MEDICINE

## 2022-09-02 PROCEDURE — 1123F ACP DISCUSS/DSCN MKR DOCD: CPT | Performed by: INTERNAL MEDICINE

## 2022-09-02 RX ORDER — MOMETASONE FUROATE 50 UG/1
2 SPRAY, METERED NASAL DAILY
Qty: 3 EACH | Refills: 1 | Status: SHIPPED | OUTPATIENT
Start: 2022-09-02

## 2022-09-02 SDOH — ECONOMIC STABILITY: FOOD INSECURITY: WITHIN THE PAST 12 MONTHS, THE FOOD YOU BOUGHT JUST DIDN'T LAST AND YOU DIDN'T HAVE MONEY TO GET MORE.: NEVER TRUE

## 2022-09-02 SDOH — ECONOMIC STABILITY: FOOD INSECURITY: WITHIN THE PAST 12 MONTHS, YOU WORRIED THAT YOUR FOOD WOULD RUN OUT BEFORE YOU GOT MONEY TO BUY MORE.: NEVER TRUE

## 2022-09-02 ASSESSMENT — PATIENT HEALTH QUESTIONNAIRE - PHQ9
1. LITTLE INTEREST OR PLEASURE IN DOING THINGS: 0
2. FEELING DOWN, DEPRESSED OR HOPELESS: 0
8. MOVING OR SPEAKING SO SLOWLY THAT OTHER PEOPLE COULD HAVE NOTICED. OR THE OPPOSITE, BEING SO FIGETY OR RESTLESS THAT YOU HAVE BEEN MOVING AROUND A LOT MORE THAN USUAL: 0
5. POOR APPETITE OR OVEREATING: 0
10. IF YOU CHECKED OFF ANY PROBLEMS, HOW DIFFICULT HAVE THESE PROBLEMS MADE IT FOR YOU TO DO YOUR WORK, TAKE CARE OF THINGS AT HOME, OR GET ALONG WITH OTHER PEOPLE: 0
4. FEELING TIRED OR HAVING LITTLE ENERGY: 0
3. TROUBLE FALLING OR STAYING ASLEEP: 0
SUM OF ALL RESPONSES TO PHQ9 QUESTIONS 1 & 2: 0
SUM OF ALL RESPONSES TO PHQ QUESTIONS 1-9: 0
6. FEELING BAD ABOUT YOURSELF - OR THAT YOU ARE A FAILURE OR HAVE LET YOURSELF OR YOUR FAMILY DOWN: 0
7. TROUBLE CONCENTRATING ON THINGS, SUCH AS READING THE NEWSPAPER OR WATCHING TELEVISION: 0
SUM OF ALL RESPONSES TO PHQ QUESTIONS 1-9: 0
SUM OF ALL RESPONSES TO PHQ QUESTIONS 1-9: 0
9. THOUGHTS THAT YOU WOULD BE BETTER OFF DEAD, OR OF HURTING YOURSELF: 0
SUM OF ALL RESPONSES TO PHQ QUESTIONS 1-9: 0

## 2022-09-02 ASSESSMENT — LIFESTYLE VARIABLES
HOW MANY STANDARD DRINKS CONTAINING ALCOHOL DO YOU HAVE ON A TYPICAL DAY: PATIENT DOES NOT DRINK
HOW OFTEN DO YOU HAVE A DRINK CONTAINING ALCOHOL: NEVER

## 2022-09-02 ASSESSMENT — SOCIAL DETERMINANTS OF HEALTH (SDOH): HOW HARD IS IT FOR YOU TO PAY FOR THE VERY BASICS LIKE FOOD, HOUSING, MEDICAL CARE, AND HEATING?: NOT HARD AT ALL

## 2022-09-02 NOTE — PROGRESS NOTES
Medicare Annual Wellness Visit    Darron Mcmullen is here for Medicare AWV    Assessment & Plan   Medicare annual wellness visit, subsequent  Mild intermittent extrinsic asthma without complication  -     mometasone (NASONEX) 50 MCG/ACT nasal spray; 2 sprays by Nasal route daily, Nasal, DAILY Starting Fri 9/2/2022, Disp-3 each, R-1, Normal  Mild episode of recurrent major depressive disorder ([facility]  Comments:  Patient is doing well on Lexapro  Essential hypertension, benign  -     CBC with Auto Differential; Future  -     Comprehensive Metabolic Panel; Future  -     Lipid, Fasting; Future  -     T4, Free; Future  -     TSH; Future  Morbid obesity ([facility] [address]  Comments:  Chronic, controlled. Recommendations for Preventive Services Due: see orders and patient instructions/AVS.  Recommended screening schedule for the next 5-10 years is provided to the patient in written form: see Patient Instructions/AVS.     Return for Medicare Annual Wellness Visit in 1 year. Subjective   The following acute and/or chronic problems were also addressed today:  GERD, Asthma    Patient's complete Health Risk Assessment and screening values have been reviewed and are found in Flowsheets. The following problems were reviewed today and where indicated follow up appointments were made and/or referrals ordered.     Positive Risk Factor Screenings with Interventions:             General Health and ACP:  General  In general, how would you say your health is?: Good  In the past 7 days, have you experienced any of the following: New or Increased Pain, New or Increased Fatigue, Loneliness, Social Isolation, Stress or Anger?: No  Do you get the social and emotional support that you need?: Yes  Do you have a Living Will?: (!) No    Advance Directives       Power of  Living Will ACP-Advance Directive ACP-Power of     Not on File Filed on 07/21/13 Filed Not on File          General Health Risk Interventions:  No Living Will: Advance Care Planning addressed with patient today    Health Habits/Nutrition:  Physical Activity: Insufficiently Active    Days of Exercise per Week: 2 days    Minutes of Exercise per Session: 10 min     Have you lost any weight without trying in the past 3 months?: No  Body mass index: (!) 40.56  Have you seen the dentist within the past year?: (!) No  Health Habits/Nutrition Interventions:  Dental exam overdue:  patient encouraged to make appointment with his/her dentist    Hearing/Vision:  Do you or your family notice any trouble with your hearing that hasn't been managed with hearing aids?: No  Do you have difficulty driving, watching TV, or doing any of your daily activities because of your eyesight?: No  Have you had an eye exam within the past year?: (!) No  No results found. Hearing/Vision Interventions:  Vision concerns:  ophthalmology/optometry referral provided            Objective   Vitals:    09/02/22 0946   BP: 132/72   Pulse: 57   Temp: 97.1 °F (36.2 °C)   TempSrc: Infrared   SpO2: 98%   Weight: 259 lb (117.5 kg)   Height: 5' 7\" (1.702 m)      Body mass index is 40.57 kg/m². Physical Exam  Vitals reviewed. Constitutional:       General: She is not in acute distress. Appearance: Normal appearance. She is well-developed. She is not diaphoretic. HENT:      Head: Normocephalic and atraumatic. Right Ear: External ear normal.      Left Ear: External ear normal.      Nose: No nasal deformity or rhinorrhea. Mouth/Throat:      Mouth: No lacerations or oral lesions. Dentition: Does not have dentures. Pharynx: No oropharyngeal exudate or uvula swelling. Neck:      Thyroid: No thyroid mass or thyromegaly. Vascular: Normal carotid pulses. No carotid bruit, hepatojugular reflux or JVD. Trachea: Trachea normal. No tracheal tenderness or tracheal deviation. Cardiovascular:      Rate and Rhythm: Normal rate and regular rhythm. Chest Wall: PMI is not displaced.       Pulses: Normal pulses. Heart sounds: Normal heart sounds, S1 normal and S2 normal. Heart sounds not distant. No murmur heard. No systolic murmur is present. No diastolic murmur is present. No friction rub. No gallop. No S3 or S4 sounds. Pulmonary:      Effort: Pulmonary effort is normal.      Breath sounds: No stridor. Abdominal:      General: Bowel sounds are normal. There is no distension or abdominal bruit. Palpations: Abdomen is soft. There is no shifting dullness or mass. Tenderness: There is no abdominal tenderness. Musculoskeletal:      Cervical back: Full passive range of motion without pain, normal range of motion and neck supple. No edema or rigidity. Lymphadenopathy:      Cervical: No cervical adenopathy. Skin:     General: Skin is warm and dry. Coloration: Skin is not pale. Findings: No rash. Neurological:      Mental Status: She is alert and oriented to person, place, and time. Cranial Nerves: No cranial nerve deficit. Psychiatric:         Mood and Affect: Mood is not anxious. Speech: Speech normal.         Behavior: Behavior normal. Behavior is not agitated. Thought Content: Thought content normal.         Judgment: Judgment normal.            Allergies   Allergen Reactions    Oxycodone Nausea Only    Penicillins Nausea Only     Prior to Visit Medications    Medication Sig Taking?  Authorizing Provider   mometasone (NASONEX) 50 MCG/ACT nasal spray 2 sprays by Nasal route daily Yes Vashti West MD   montelukast (SINGULAIR) 10 MG tablet TAKE 1 TABLET DAILY Yes Vashti West MD   famotidine (PEPCID) 20 MG tablet TAKE 1 TABLET NIGHTLY Yes Vashti West MD   losartan (COZAAR) 100 MG tablet TAKE 1 TABLET DAILY Yes Vashti West MD   escitalopram (LEXAPRO) 10 MG tablet TAKE 1 TABLET DAILY Yes Vashti West MD   atorvastatin (LIPITOR) 20 MG tablet TAKE 1 TABLET DAILY Yes Vashti West MD   albuterol sulfate  (90 Base) MCG/ACT inhaler USE 2 INHALATIONS EVERY 6 HOURS AS NEEDED FOR WHEEZING Yes Luma Grissom MD   amLODIPine (NORVASC) 10 MG tablet TAKE 1 TABLET DAILY Yes Luma Grissom MD   triamterene-hydroCHLOROthiazide (MAXZIDE-25) 37.5-25 MG per tablet TAKE 1 TABLET DAILY Yes Luma Grissom MD   fluocinolone ([address]) 0.01 % OIL oil Place 3 drops in ear(s) 2 times daily as needed (ear itching) Yes Araceli Em DO   budesonide-formoterol (SYMBICORT) 160-4.5 MCG/ACT AERO Inhale 2 puffs into the lungs 2 times daily Yes Historical Provider, MD   Cholecalciferol (VITAMIN D) 2000 UNITS CAPS capsule Take by mouth Yes Historical Provider, MD   dorzolamide (TRUSOPT) 2 % ophthalmic solution Place 2 drops into both eyes 2 times daily Yes Luma Grissom MD   aspirin 325 MG tablet Take 325 mg by mouth daily. Yes Historical Provider, MD   B Complex-C-Folic Acid (VITABEE/C PO) Take 1 tablet by mouth. Yes Historical Provider, MD   FISH OIL Take 1,000 mg by mouth daily   Yes Historical Provider, MD   Calcium Carb-Cholecalciferol 500-400 MG-UNIT TABS Take 2 tablets by mouth daily.    Yes Historical Provider, MD   pantoprazole (PROTONIX) 40 MG tablet Take 1 tablet by mouth 2 times daily (before meals)  Patient not taking: Reported on 9/2/2022  Luma Grissom MD   albuterol (PROVENTIL) (2.5 MG/3ML) 0.083% nebulizer solution Take 3 mLs by nebulization once for 1 dose  Dinora Leon MD       [facility] (Including outside providers/suppliers regularly involved in providing care):   Patient Care Team:  Luma Grissom MD as PCP - Franco Sifuentes MD as PCP - [facility] Empaneled Provider     Reviewed and updated this visit:  Tobacco  Allergies  Meds  Problems  Med Hx  Surg Hx  Soc Hx  Fam Hx          MD Aaliyah Hicks

## 2022-09-02 NOTE — PATIENT INSTRUCTIONS
Personalized Preventive Plan for Bijal Joyce - 9/2/2022  Medicare offers a range of preventive health benefits. Some of the tests and screenings are paid in full while other may be subject to a deductible, co-insurance, and/or copay. Some of these benefits include a comprehensive review of your medical history including lifestyle, illnesses that may run in your family, and various assessments and screenings as appropriate. After reviewing your medical record and screening and assessments performed today your provider may have ordered immunizations, labs, imaging, and/or referrals for you. A list of these orders (if applicable) as well as your Preventive Care list are included within your After Visit Summary for your review. Other Preventive Recommendations:    A preventive eye exam performed by an eye specialist is recommended every 1-2 years to screen for glaucoma; cataracts, macular degeneration, and other eye disorders. A preventive dental visit is recommended every 6 months. Try to get at least 150 minutes of exercise per week or 10,000 steps per day on a pedometer . Order or download the FREE \"Exercise & Physical Activity: Your Everyday Guide\" from [employer]. Call 4-455.293.8646 or search [employer] online. You need 6021-4523 mg of calcium and 3795-2421 IU of vitamin D per day. It is possible to meet your calcium requirement with diet alone, but a vitamin D supplement is usually necessary to meet this goal.  When exposed to the sun, use a sunscreen that protects against both UVA and UVB radiation with an SPF of 30 or greater. Reapply every 2 to 3 hours or after sweating, drying off with a towel, or swimming. Always wear a seat belt when traveling in a car. Always wear a helmet when riding a bicycle or motorcycle.

## 2022-09-06 ENCOUNTER — TELEPHONE (OUTPATIENT)
Dept: INTERNAL MEDICINE CLINIC | Age: 77
End: 2022-09-06

## 2022-09-06 DIAGNOSIS — R89.9 ABNORMAL LABORATORY TEST RESULT: Primary | ICD-10-CM

## 2022-09-06 DIAGNOSIS — D64.9 ANEMIA, UNSPECIFIED TYPE: ICD-10-CM

## 2022-09-06 LAB
FERRITIN: 106.4 NG/ML (ref 15–150)
IRON SATURATION: 33 % (ref 15–50)
IRON: 111 UG/DL (ref 37–145)
TOTAL IRON BINDING CAPACITY: 340 UG/DL (ref 260–445)

## 2022-09-06 NOTE — TELEPHONE ENCOUNTER
[facility]    Called regarding the following prescription  mometasone (NASONEX) 50 MCG/ACT nasal spray     Called stating insurance will not cover    The following medication are coverage:  Flunisolide 25 mg -MCG nasal spray  Fluncicasone 50 mg MA-MCG nasal spray    REF: [id number]    Requesting VERBAL order  -verbal order is only good for today (09/6/2022)    Phone: [phone]

## 2022-09-07 RX ORDER — FLUTICASONE PROPIONATE 50 MCG
2 SPRAY, SUSPENSION (ML) NASAL DAILY
Qty: 48 G | Refills: 1 | Status: SHIPPED | OUTPATIENT
Start: 2022-09-07

## 2022-10-11 RX ORDER — ATORVASTATIN CALCIUM 20 MG/1
TABLET, FILM COATED ORAL
Qty: 90 TABLET | Refills: 3 | Status: SHIPPED | OUTPATIENT
Start: 2022-10-11

## 2023-01-06 DIAGNOSIS — I10 ESSENTIAL HYPERTENSION, BENIGN: ICD-10-CM

## 2023-01-06 RX ORDER — TRIAMTERENE AND HYDROCHLOROTHIAZIDE 37.5; 25 MG/1; MG/1
TABLET ORAL
Qty: 90 TABLET | Refills: 3 | Status: SHIPPED | OUTPATIENT
Start: 2023-01-06

## 2023-01-06 RX ORDER — AMLODIPINE BESYLATE 10 MG/1
TABLET ORAL
Qty: 90 TABLET | Refills: 3 | Status: SHIPPED | OUTPATIENT
Start: 2023-01-06

## 2023-02-22 DIAGNOSIS — J45.20 MILD INTERMITTENT EXTRINSIC ASTHMA WITHOUT COMPLICATION: ICD-10-CM

## 2023-02-22 RX ORDER — FAMOTIDINE 20 MG/1
TABLET, FILM COATED ORAL
Qty: 90 TABLET | Refills: 1 | Status: SHIPPED | OUTPATIENT
Start: 2023-02-22

## 2023-02-22 RX ORDER — MONTELUKAST SODIUM 10 MG/1
TABLET ORAL
Qty: 90 TABLET | Refills: 1 | Status: SHIPPED | OUTPATIENT
Start: 2023-02-22

## 2023-03-06 ENCOUNTER — OFFICE VISIT (OUTPATIENT)
Dept: INTERNAL MEDICINE CLINIC | Age: 78
End: 2023-03-06
Payer: MEDICARE

## 2023-03-06 VITALS
WEIGHT: 260 LBS | HEART RATE: 67 BPM | TEMPERATURE: 96.9 F | SYSTOLIC BLOOD PRESSURE: 136 MMHG | DIASTOLIC BLOOD PRESSURE: 68 MMHG | BODY MASS INDEX: 40.72 KG/M2 | OXYGEN SATURATION: 98 %

## 2023-03-06 DIAGNOSIS — F33.0 MILD EPISODE OF RECURRENT MAJOR DEPRESSIVE DISORDER (HCC): Primary | ICD-10-CM

## 2023-03-06 DIAGNOSIS — I10 ESSENTIAL HYPERTENSION, BENIGN: ICD-10-CM

## 2023-03-06 DIAGNOSIS — E66.01 MORBID OBESITY (HCC): Chronic | ICD-10-CM

## 2023-03-06 PROCEDURE — 3078F DIAST BP <80 MM HG: CPT | Performed by: INTERNAL MEDICINE

## 2023-03-06 PROCEDURE — 99213 OFFICE O/P EST LOW 20 MIN: CPT | Performed by: INTERNAL MEDICINE

## 2023-03-06 PROCEDURE — 3075F SYST BP GE 130 - 139MM HG: CPT | Performed by: INTERNAL MEDICINE

## 2023-03-06 PROCEDURE — 1123F ACP DISCUSS/DSCN MKR DOCD: CPT | Performed by: INTERNAL MEDICINE

## 2023-03-06 RX ORDER — ORAL SEMAGLUTIDE 3 MG/1
1 TABLET ORAL DAILY
Qty: 30 TABLET | Refills: 0 | COMMUNITY
Start: 2023-03-06

## 2023-03-06 SDOH — ECONOMIC STABILITY: FOOD INSECURITY: WITHIN THE PAST 12 MONTHS, THE FOOD YOU BOUGHT JUST DIDN'T LAST AND YOU DIDN'T HAVE MONEY TO GET MORE.: NEVER TRUE

## 2023-03-06 SDOH — ECONOMIC STABILITY: HOUSING INSECURITY
IN THE LAST 12 MONTHS, WAS THERE A TIME WHEN YOU DID NOT HAVE A STEADY PLACE TO SLEEP OR SLEPT IN A SHELTER (INCLUDING NOW)?: NO

## 2023-03-06 SDOH — ECONOMIC STABILITY: FOOD INSECURITY: WITHIN THE PAST 12 MONTHS, YOU WORRIED THAT YOUR FOOD WOULD RUN OUT BEFORE YOU GOT MONEY TO BUY MORE.: NEVER TRUE

## 2023-03-06 SDOH — ECONOMIC STABILITY: INCOME INSECURITY: HOW HARD IS IT FOR YOU TO PAY FOR THE VERY BASICS LIKE FOOD, HOUSING, MEDICAL CARE, AND HEATING?: NOT HARD AT ALL

## 2023-03-06 ASSESSMENT — PATIENT HEALTH QUESTIONNAIRE - PHQ9
SUM OF ALL RESPONSES TO PHQ QUESTIONS 1-9: 0
6. FEELING BAD ABOUT YOURSELF - OR THAT YOU ARE A FAILURE OR HAVE LET YOURSELF OR YOUR FAMILY DOWN: 0
SUM OF ALL RESPONSES TO PHQ9 QUESTIONS 1 & 2: 0
SUM OF ALL RESPONSES TO PHQ QUESTIONS 1-9: 0
10. IF YOU CHECKED OFF ANY PROBLEMS, HOW DIFFICULT HAVE THESE PROBLEMS MADE IT FOR YOU TO DO YOUR WORK, TAKE CARE OF THINGS AT HOME, OR GET ALONG WITH OTHER PEOPLE: 0
2. FEELING DOWN, DEPRESSED OR HOPELESS: 0
SUM OF ALL RESPONSES TO PHQ QUESTIONS 1-9: 0
1. LITTLE INTEREST OR PLEASURE IN DOING THINGS: 0
8. MOVING OR SPEAKING SO SLOWLY THAT OTHER PEOPLE COULD HAVE NOTICED. OR THE OPPOSITE, BEING SO FIGETY OR RESTLESS THAT YOU HAVE BEEN MOVING AROUND A LOT MORE THAN USUAL: 0
4. FEELING TIRED OR HAVING LITTLE ENERGY: 0
7. TROUBLE CONCENTRATING ON THINGS, SUCH AS READING THE NEWSPAPER OR WATCHING TELEVISION: 0
9. THOUGHTS THAT YOU WOULD BE BETTER OFF DEAD, OR OF HURTING YOURSELF: 0
5. POOR APPETITE OR OVEREATING: 0
3. TROUBLE FALLING OR STAYING ASLEEP: 0
SUM OF ALL RESPONSES TO PHQ QUESTIONS 1-9: 0

## 2023-03-06 NOTE — PROGRESS NOTES
Chief Complaint   Patient presents with    Hypertension     Assessment/Plan:  Eduardo Schwartz was seen today for hypertension. Diagnoses and all orders for this visit:    Mild episode of recurrent major depressive disorder ([address]  Comments:  She is taking Lexapro. It is helpful. Morbid obesity ([address]  Comments:  Chronic, not taking any medications. Samples of Rybelsus were given. Orders:  -     Semaglutide (RYBELSUS) 3 MG TABS; Take 1 tablet by mouth daily    Essential hypertension, benign  Comments:  Chronic, controlled. Vitals:    03/06/23 1023   BP: 136/68   Pulse: 67   Temp: 96.9 °F (36.1 °C)   SpO2: 98%     Physical Exam  Vitals reviewed. Constitutional:       General: She is not in acute distress. Appearance: She is well-developed. She is not diaphoretic. HENT:      Head: Normocephalic and atraumatic. Pulmonary:      Effort: Pulmonary effort is normal.   Neurological:      Mental Status: She is alert and oriented to person, place, and time. Cranial Nerves: No cranial nerve deficit. Psychiatric:         Behavior: Behavior normal.         Thought Content:  Thought content normal.         Judgment: Judgment normal.       PHQ Scores 3/6/2023 9/2/2022 9/1/2021 3/1/2021 8/31/2020 3/30/2020 8/30/2019   PHQ2 Score 0 0 0 0 0 0 1   PHQ9 Score 0 0 0 0 0 0 1     Interpretation of Total Score Depression Severity: 1-4 = Minimal depression, 5-9 = Mild depression, 10-14 = Moderate depression, 15-19 = Moderately severe depression, 20-27 = Severe depression    John Cook MD  Creed Tacho

## 2023-03-21 ENCOUNTER — OFFICE VISIT (OUTPATIENT)
Dept: CARDIOLOGY CLINIC | Age: 78
End: 2023-03-21
Payer: MEDICARE

## 2023-03-21 VITALS
SYSTOLIC BLOOD PRESSURE: 138 MMHG | DIASTOLIC BLOOD PRESSURE: 80 MMHG | WEIGHT: 260 LBS | HEART RATE: 48 BPM | HEIGHT: 67 IN | BODY MASS INDEX: 40.81 KG/M2

## 2023-03-21 DIAGNOSIS — Z00.00 ROUTINE CHECK-UP: Primary | ICD-10-CM

## 2023-03-21 DIAGNOSIS — R01.1 MURMUR, CARDIAC: ICD-10-CM

## 2023-03-21 PROCEDURE — 3075F SYST BP GE 130 - 139MM HG: CPT | Performed by: INTERNAL MEDICINE

## 2023-03-21 PROCEDURE — 93000 ELECTROCARDIOGRAM COMPLETE: CPT | Performed by: INTERNAL MEDICINE

## 2023-03-21 PROCEDURE — 3079F DIAST BP 80-89 MM HG: CPT | Performed by: INTERNAL MEDICINE

## 2023-03-21 PROCEDURE — 99204 OFFICE O/P NEW MOD 45 MIN: CPT | Performed by: INTERNAL MEDICINE

## 2023-03-21 NOTE — PROGRESS NOTES
[facility] [place] [id number]     Outpatient Cardiology Consult  Consulting Cardiologist Landon Mejia MD, M.D., [facility]  Referring Provider:  Tavares Ly MD    7/08/1258,7:57 AM    Chief Complaint   Patient presents with    New Patient     Heart Murmur           Asked by No admitting provider for patient encounter./Luis Felipe Urrutia MD  to evaluate and assess this patient's heart murmur    History of Present Illness: Azar Young is a 68 y.o. female is here as a new patient for assessment of her current cardiac status. She has no particular issues other than been told that she had a heart murmur by 1 of those outpatient assessment concerns. She has no chest pains or shortness of breath or dyspnea. She is 68 and apparently runs a  and she is climbing up and down 2 or 3 flights of stairs most of the day and has no particular issues except for some posttraumatic discomfort in her right leg. She is a large statured woman at 12. Blood pressure today is 138/80 pulse of 50 and she is afebrile. Does have a history of hypertension hyperlipidemia and asthma. Injury to her right knee. Does not smoke. Did have COVID vaccines. Hyperlipidemia LDL currently 66 on Lipitor 20  History of asthma and has some inhalers that she takes periodically  Traumatic stress on her right knee. Past Medical History:   has a past medical history of Asthma, Essential hypertension, benign, Extrinsic asthma, GERD (gastroesophageal reflux disease), Glaucoma (increased eye pressure), Hyperlipidemia, Mild episode of recurrent major depressive disorder ([address] Mixed hyperlipidemia, and Reactive airway disease. Surgical History:   has a past surgical history that includes Hysterectomy and Partial hysterectomy. Social History:   reports that she has never smoked. She has never used smokeless tobacco. She reports that she does not drink alcohol and does not use drugs.

## 2023-06-05 ENCOUNTER — HOSPITAL ENCOUNTER (OUTPATIENT)
Dept: NON INVASIVE DIAGNOSTICS | Age: 78
Discharge: HOME | End: 2023-06-05
Payer: MEDICARE

## 2023-06-05 LAB
LV EF: 58 %
LVEF MODALITY: NORMAL

## 2023-06-05 PROCEDURE — 93306 TTE W/DOPPLER COMPLETE: CPT

## 2023-06-28 ENCOUNTER — OFFICE VISIT (OUTPATIENT)
Dept: INTERNAL MEDICINE CLINIC | Age: 78
End: 2023-06-28

## 2023-06-28 VITALS
WEIGHT: 263 LBS | SYSTOLIC BLOOD PRESSURE: 136 MMHG | DIASTOLIC BLOOD PRESSURE: 72 MMHG | TEMPERATURE: 97.1 F | HEART RATE: 56 BPM | BODY MASS INDEX: 41.19 KG/M2 | OXYGEN SATURATION: 98 %

## 2023-06-28 DIAGNOSIS — E66.01 MORBID OBESITY (HCC): Chronic | ICD-10-CM

## 2023-06-28 RX ORDER — ORAL SEMAGLUTIDE 3 MG/1
1 TABLET ORAL DAILY
Qty: 30 TABLET | Refills: 2 | Status: SHIPPED | OUTPATIENT
Start: 2023-06-28

## 2023-06-28 ASSESSMENT — PATIENT HEALTH QUESTIONNAIRE - PHQ9
6. FEELING BAD ABOUT YOURSELF - OR THAT YOU ARE A FAILURE OR HAVE LET YOURSELF OR YOUR FAMILY DOWN: 0
9. THOUGHTS THAT YOU WOULD BE BETTER OFF DEAD, OR OF HURTING YOURSELF: 0
8. MOVING OR SPEAKING SO SLOWLY THAT OTHER PEOPLE COULD HAVE NOTICED. OR THE OPPOSITE, BEING SO FIGETY OR RESTLESS THAT YOU HAVE BEEN MOVING AROUND A LOT MORE THAN USUAL: 0
SUM OF ALL RESPONSES TO PHQ QUESTIONS 1-9: 0
5. POOR APPETITE OR OVEREATING: 0
SUM OF ALL RESPONSES TO PHQ9 QUESTIONS 1 & 2: 0
1. LITTLE INTEREST OR PLEASURE IN DOING THINGS: 0
SUM OF ALL RESPONSES TO PHQ QUESTIONS 1-9: 0
7. TROUBLE CONCENTRATING ON THINGS, SUCH AS READING THE NEWSPAPER OR WATCHING TELEVISION: 0
SUM OF ALL RESPONSES TO PHQ QUESTIONS 1-9: 0
2. FEELING DOWN, DEPRESSED OR HOPELESS: 0
SUM OF ALL RESPONSES TO PHQ QUESTIONS 1-9: 0
4. FEELING TIRED OR HAVING LITTLE ENERGY: 0
3. TROUBLE FALLING OR STAYING ASLEEP: 0
10. IF YOU CHECKED OFF ANY PROBLEMS, HOW DIFFICULT HAVE THESE PROBLEMS MADE IT FOR YOU TO DO YOUR WORK, TAKE CARE OF THINGS AT HOME, OR GET ALONG WITH OTHER PEOPLE: 0

## 2023-07-10 DIAGNOSIS — F33.0 MILD EPISODE OF RECURRENT MAJOR DEPRESSIVE DISORDER (HCC): ICD-10-CM

## 2023-07-10 RX ORDER — ESCITALOPRAM OXALATE 10 MG/1
TABLET ORAL
Qty: 90 TABLET | Refills: 3 | Status: SHIPPED | OUTPATIENT
Start: 2023-07-10

## 2023-07-10 NOTE — TELEPHONE ENCOUNTER
Refill Request Dr Caridad Acharya Pt           Recent Visits  Date Type Provider Dept   06/28/23 Office Visit Ferdinand Dakin, MD [facility]   03/06/23 Office Visit Ferdinand Dakin, MD [facility]   09/02/22 Office Visit Ferdinand Dakin, MD [facility]   Showing recent visits within past 540 days with a meds authorizing provider and meeting all other requirements  Future Appointments  Date Type Provider Dept   10/09/23 Appointment Ferdinand Dakin, MD [facility]   Showing future appointments within next 150 days with a meds authorizing provider and meeting all other requirements     6/28/2023

## 2023-07-18 DIAGNOSIS — I10 ESSENTIAL HYPERTENSION, BENIGN: ICD-10-CM

## 2023-07-18 RX ORDER — LOSARTAN POTASSIUM 100 MG/1
TABLET ORAL
Qty: 90 TABLET | Refills: 3 | Status: SHIPPED | OUTPATIENT
Start: 2023-07-18

## 2023-08-01 ENCOUNTER — TELEPHONE (OUTPATIENT)
Dept: INTERNAL MEDICINE CLINIC | Age: 78
End: 2023-08-01

## 2023-08-01 RX ORDER — ORAL SEMAGLUTIDE 3 MG/1
3 TABLET ORAL DAILY
Qty: 30 TABLET | Refills: 0 | COMMUNITY
Start: 2023-08-01

## 2023-08-01 NOTE — TELEPHONE ENCOUNTER
Semaglutide (RYBELSUS) 3 MG TABS [[id number]]     Order Details  Dose: 1 tablet Route: Oral Frequency: DAILY   Dispense Quantity: 30 tablet Refills: 2          Sig: Take 1 tablet by mouth daily         Start Date: 06/28/23 End Date: --   Written Date: 06/28/23 Expiration Date: 06/27/24           Pt says that Dr Colt Hidalgo told her to pick it up at the office. I see where the  last time, was sent to [facility].     Please advise

## 2023-08-21 DIAGNOSIS — J45.20 MILD INTERMITTENT EXTRINSIC ASTHMA WITHOUT COMPLICATION: ICD-10-CM

## 2023-08-21 RX ORDER — MONTELUKAST SODIUM 10 MG/1
TABLET ORAL
Qty: 90 TABLET | Refills: 3 | Status: SHIPPED | OUTPATIENT
Start: 2023-08-21

## 2023-08-21 RX ORDER — FAMOTIDINE 20 MG/1
TABLET, FILM COATED ORAL
Qty: 90 TABLET | Refills: 3 | Status: SHIPPED | OUTPATIENT
Start: 2023-08-21

## 2023-08-21 NOTE — TELEPHONE ENCOUNTER
Recent Visits  Date Type Provider Dept   06/28/23 Office Visit Kristin Chun MD [facility]   03/06/23 Office Visit Kristin Chun MD [facility]   09/02/22 Office Visit Kristin Chun MD [facility]   Showing recent visits within past 540 days with a meds authorizing provider and meeting all other requirements  Future Appointments  Date Type Provider Dept   10/09/23 Appointment Kristin Chun MD [facility]   Showing future appointments within next 150 days with a meds authorizing provider and meeting all other requirements     6/28/2023

## 2023-08-31 ENCOUNTER — TELEPHONE (OUTPATIENT)
Dept: INTERNAL MEDICINE CLINIC | Age: 78
End: 2023-08-31

## 2023-08-31 ENCOUNTER — OFFICE VISIT (OUTPATIENT)
Dept: CARDIOLOGY CLINIC | Age: 78
End: 2023-08-31

## 2023-08-31 VITALS
HEART RATE: 54 BPM | BODY MASS INDEX: 42.22 KG/M2 | SYSTOLIC BLOOD PRESSURE: 128 MMHG | DIASTOLIC BLOOD PRESSURE: 80 MMHG | HEIGHT: 67 IN | WEIGHT: 269 LBS

## 2023-08-31 DIAGNOSIS — F33.1 MAJOR DEPRESSIVE DISORDER, RECURRENT, MODERATE (HCC): ICD-10-CM

## 2023-08-31 DIAGNOSIS — F33.0 MAJOR DEPRESSIVE DISORDER, RECURRENT, MILD (HCC): ICD-10-CM

## 2023-08-31 DIAGNOSIS — E78.5 HYPERLIPIDEMIA LDL GOAL <70: Primary | ICD-10-CM

## 2023-08-31 DIAGNOSIS — E11.00 TYPE 2 DIABETES MELLITUS WITH HYPEROSMOLARITY WITHOUT COMA, WITHOUT LONG-TERM CURRENT USE OF INSULIN (HCC): ICD-10-CM

## 2023-08-31 NOTE — PROGRESS NOTES
[facility]   Dr Jose Asencio. Tremayne Rasheed MD, [address]     Outpatient Follow Up Note    8/31/2023,11:35 AM  Subjective:   CHIEF COMPLAINT / HPI:  Follow Up secondary to hypertension hyperlipidemia     Nehal Davenport is 68 y.o. female who presents today on 8/31/2023 and has no significant changes since we last saw her. She had been prescribed Rybelsus and has not taken it. Did not have chest pains or shortness of breath or dyspnea. The last LDL was 66 on Lipitor 20. Examination today is unremarkable. Lungs are clear extremities no edema. I have suggested that she contact the Rybelsus as prescribed. Should help with the weight loss and also help protect it with a diabetic complication. Does not smoke. Did have COVID vaccines. Hyperlipidemia LDL currently 66 on Lipitor 20  History of asthma and has some inhalers that she takes periodically  Traumatic stress on her right knee. Past Medical History:    Past Medical History:   Diagnosis Date    Asthma     Essential hypertension, benign 6/13/2011    Extrinsic asthma 6/13/2011    GERD (gastroesophageal reflux disease)     EGD DONE    Glaucoma (increased eye pressure) 2015    Hyperlipidemia     Mild episode of recurrent major depressive disorder ([address]) 12/21/2016    Mild episode of recurrent major depressive disorder ([address]) 12/21/2016    Mixed hyperlipidemia 5/10/2016    Reactive airway disease      Past Surgical History  Past Surgical History:   Procedure Laterality Date    HYSTERECTOMY (CERVIX STATUS UNKNOWN)      PARTIAL HYSTERECTOMY (CERVIX NOT REMOVED)       Social History:       Social History     Tobacco Use   Smoking Status Never   Smokeless Tobacco Never     Current Medications:  Prior to Visit Medications    Medication Sig Taking?  Authorizing Provider   montelukast (SINGULAIR) 10 MG tablet TAKE 1 TABLET DAILY Yes Cherylene Petite, MD   famotidine (PEPCID) 20 MG tablet TAKE 1 TABLET NIGHTLY Yes Cherylene Petite, MD

## 2023-09-01 PROBLEM — F33.1 MAJOR DEPRESSIVE DISORDER, RECURRENT, MODERATE (HCC): Status: ACTIVE | Noted: 2023-09-01

## 2023-09-01 PROBLEM — F33.9 MAJOR DEPRESSIVE DISORDER, RECURRENT, UNSPECIFIED (HCC): Status: ACTIVE | Noted: 2023-09-01

## 2023-09-01 PROBLEM — F33.0 MAJOR DEPRESSIVE DISORDER, RECURRENT, MILD (HCC): Status: ACTIVE | Noted: 2023-09-01

## 2023-09-01 PROBLEM — E11.9 TYPE 2 DIABETES MELLITUS (HCC): Status: ACTIVE | Noted: 2023-09-01

## 2023-10-06 RX ORDER — ATORVASTATIN CALCIUM 20 MG/1
TABLET, FILM COATED ORAL
Qty: 90 TABLET | Refills: 1 | Status: SHIPPED | OUTPATIENT
Start: 2023-10-06

## 2024-01-04 DIAGNOSIS — I10 ESSENTIAL HYPERTENSION, BENIGN: ICD-10-CM

## 2024-01-04 RX ORDER — TRIAMTERENE AND HYDROCHLOROTHIAZIDE 37.5; 25 MG/1; MG/1
TABLET ORAL
Qty: 90 TABLET | Refills: 1 | Status: SHIPPED | OUTPATIENT
Start: 2024-01-04

## 2024-01-04 RX ORDER — AMLODIPINE BESYLATE 10 MG/1
TABLET ORAL
Qty: 90 TABLET | Refills: 1 | Status: SHIPPED | OUTPATIENT
Start: 2024-01-04

## 2024-04-03 RX ORDER — ATORVASTATIN CALCIUM 20 MG/1
TABLET, FILM COATED ORAL
Qty: 90 TABLET | Refills: 0 | Status: SHIPPED | OUTPATIENT
Start: 2024-04-03

## 2024-04-23 NOTE — TELEPHONE ENCOUNTER
[place] [facility] calling and needs GI report  Faxed to [phone]
Last colonoscopy report is from 2017. Printed and faxed.
Multiple falls in time period

## 2024-06-15 ENCOUNTER — HOSPITAL ENCOUNTER (OUTPATIENT)
Dept: MAMMOGRAPHY | Age: 79
Discharge: HOME | End: 2024-06-15
Payer: MEDICARE

## 2024-06-15 VITALS — HEIGHT: 67 IN | WEIGHT: 269 LBS | BODY MASS INDEX: 42.22 KG/M2

## 2024-06-15 DIAGNOSIS — Z12.31 VISIT FOR SCREENING MAMMOGRAM: ICD-10-CM

## 2024-06-15 PROCEDURE — 77063 BREAST TOMOSYNTHESIS BI: CPT

## 2024-07-02 DIAGNOSIS — I10 ESSENTIAL HYPERTENSION, BENIGN: ICD-10-CM

## 2024-07-02 DIAGNOSIS — F33.0 MILD EPISODE OF RECURRENT MAJOR DEPRESSIVE DISORDER (HCC): ICD-10-CM

## 2024-07-02 RX ORDER — TRIAMTERENE AND HYDROCHLOROTHIAZIDE 37.5; 25 MG/1; MG/1
TABLET ORAL
Qty: 90 TABLET | Refills: 3 | Status: SHIPPED | OUTPATIENT
Start: 2024-07-02

## 2024-07-02 RX ORDER — ESCITALOPRAM OXALATE 10 MG/1
TABLET ORAL
Qty: 90 TABLET | Refills: 3 | Status: SHIPPED | OUTPATIENT
Start: 2024-07-02

## 2024-07-02 RX ORDER — AMLODIPINE BESYLATE 10 MG/1
TABLET ORAL
Qty: 90 TABLET | Refills: 3 | Status: SHIPPED | OUTPATIENT
Start: 2024-07-02

## 2024-07-02 RX ORDER — ATORVASTATIN CALCIUM 20 MG/1
TABLET, FILM COATED ORAL
Qty: 90 TABLET | Refills: 3 | Status: SHIPPED | OUTPATIENT
Start: 2024-07-02

## 2024-07-02 NOTE — TELEPHONE ENCOUNTER
Please call patient and schedule appointment then send back to back office.    Recent Visits  Date Type Provider Dept   06/28/23 Office Visit Luis Felipe Hilton MD [facility]   03/06/23 Office Visit Luis Felipe Hilton MD [facility]   Showing recent visits within past 540 days with a meds authorizing provider and meeting all other requirements  Future Appointments  No visits were found meeting these conditions.  Showing future appointments within next 150 days with a meds authorizing provider and meeting all other requirements     6/28/2023

## 2024-07-11 ENCOUNTER — HOSPITAL ENCOUNTER (OUTPATIENT)
Dept: ULTRASOUND IMAGING | Age: 79
Discharge: HOME | End: 2024-07-11
Payer: MEDICARE

## 2024-07-11 DIAGNOSIS — R92.8 ABNORMAL MAMMOGRAM: ICD-10-CM

## 2024-07-11 PROCEDURE — 76642 ULTRASOUND BREAST LIMITED: CPT

## 2024-07-12 DIAGNOSIS — I10 ESSENTIAL HYPERTENSION, BENIGN: ICD-10-CM

## 2024-07-12 RX ORDER — LOSARTAN POTASSIUM 100 MG/1
TABLET ORAL
Qty: 90 TABLET | Refills: 0 | Status: SHIPPED | OUTPATIENT
Start: 2024-07-12

## 2024-07-12 NOTE — TELEPHONE ENCOUNTER
Recent Visits  Date Type Provider Dept   06/28/23 Office Visit Luis Felipe Hilton MD [facility]   03/06/23 Office Visit Luis Felipe Hilton MD [facility]   Showing recent visits within past 540 days with a meds authorizing provider and meeting all other requirements  Future Appointments  Date Type Provider Dept   10/16/24 Appointment Luis Felipe Hilton MD [facility]   Showing future appointments within next 150 days with a meds authorizing provider and meeting all other requirements     6/28/2023

## 2024-07-23 ENCOUNTER — HOSPITAL ENCOUNTER (OUTPATIENT)
Dept: MAMMOGRAPHY | Age: 79
Discharge: HOME | End: 2024-07-23
Payer: MEDICARE

## 2024-07-23 ENCOUNTER — HOSPITAL ENCOUNTER (OUTPATIENT)
Dept: ULTRASOUND IMAGING | Age: 79
Discharge: HOME | End: 2024-07-23
Payer: MEDICARE

## 2024-07-23 DIAGNOSIS — R93.89 ABNORMAL ULTRASOUND: ICD-10-CM

## 2024-07-23 DIAGNOSIS — R92.8 ABNORMAL FINDINGS ON DIAGNOSTIC IMAGING OF BREAST: ICD-10-CM

## 2024-07-23 PROCEDURE — 88360 TUMOR IMMUNOHISTOCHEM/MANUAL: CPT

## 2024-07-23 PROCEDURE — 88342 IMHCHEM/IMCYTCHM 1ST ANTB: CPT

## 2024-07-23 PROCEDURE — 88341 IMHCHEM/IMCYTCHM EA ADD ANTB: CPT

## 2024-07-23 PROCEDURE — 19083 BX BREAST 1ST LESION US IMAG: CPT

## 2024-07-23 PROCEDURE — 77065 DX MAMMO INCL CAD UNI: CPT

## 2024-07-23 PROCEDURE — 88305 TISSUE EXAM BY PATHOLOGIST: CPT

## 2024-07-25 ENCOUNTER — TELEPHONE (OUTPATIENT)
Dept: INTERNAL MEDICINE CLINIC | Age: 79
End: 2024-07-25

## 2024-07-25 NOTE — TELEPHONE ENCOUNTER
Patient received a call from [facility] regarding her breast biopsy that was done on 07/23/24.    She said she was advised to get in touch w/her PCP to discuss the results.    Reading Physician Reading Date Result Priority   Carey Camacho MD  [phone] 7/23/2024    Carey Camacho MD  [phone] 7/25/2024      Physician Responsible for MQSA Outcome Reason    Carey Camacho MD Signed      Addend   ADDENDUM #1  Addendum:   PATHOLOGY      PATHOLOGY: Left breast mass 11:00, 5 cm from the nipple, butterfly clip:  Invasive ductal carcinoma     CONCORDANT: Findings are concordant     LESION BI-RADS: 6 (Biopsy proven malignancy)     RECOMMENDATIONS: Surgical referral.     NOTIFICATION: The results and recommendations will be communicated to the  patient by Mita cisneros breast .     Electronically signed by Carey Camacho     Electronically Signed by: CAREY CAMACHO on Thu Jul 25, 2024 10:13:06 AM EDT

## 2024-07-29 ENCOUNTER — TELEPHONE (OUTPATIENT)
Dept: SURGERY | Age: 79
End: 2024-07-29

## 2024-07-29 NOTE — TELEPHONE ENCOUNTER
Breast History:  History of Previous Breast Biopsy: ( 24) Left breast, 11:00, 5 cm FN, butterfly clip, core biopsy:     -Invasive ductal carcinoma, preliminary grade 2.     Self Breast Exams Completed:  Family History of Breast Cancer: n/a  Family History of Other Cancers: Father, lung cancer   [employer]kenazi Congregation Decent:no   Bra Size: unsure   KELLIE Risk Assessment _____ %    Gyne History:  : 2  Para: 1  Age of Menarche: 13  Age of Menopause:  Partial hysterectomy in her 30's  Age of first live Birth: 22  History of Hysterectomy / COOPER-BSO: Partial   History of OCP's/HRT: OCP's patient states she took in her 20's unsure for how long   Family History or personal history of Ovarian Cancer: n/a    Lifestyle:  Smoker ( Current / Former ): n/a  ETOH ( alcohol consumption ): none  Exercise: no  Caffeine: 1 cup of tea a few days out of the week   Drug use ( including THC/ Mariajuana )no

## 2024-08-06 ENCOUNTER — PREP FOR PROCEDURE (OUTPATIENT)
Dept: SURGERY | Age: 79
End: 2024-08-06

## 2024-08-06 ENCOUNTER — OFFICE VISIT (OUTPATIENT)
Dept: SURGERY | Age: 79
End: 2024-08-06
Payer: MEDICARE

## 2024-08-06 VITALS — HEIGHT: 67 IN | HEART RATE: 60 BPM | OXYGEN SATURATION: 98 % | BODY MASS INDEX: 42.22 KG/M2 | WEIGHT: 269 LBS

## 2024-08-06 DIAGNOSIS — C50.212 MALIGNANT NEOPLASM OF UPPER-INNER QUADRANT OF LEFT BREAST IN FEMALE, ESTROGEN RECEPTOR POSITIVE (HCC): ICD-10-CM

## 2024-08-06 DIAGNOSIS — Z17.0 MALIGNANT NEOPLASM OF UPPER-INNER QUADRANT OF LEFT BREAST IN FEMALE, ESTROGEN RECEPTOR POSITIVE (HCC): ICD-10-CM

## 2024-08-06 DIAGNOSIS — C50.212 MALIGNANT NEOPLASM OF UPPER-INNER QUADRANT OF LEFT BREAST IN FEMALE, ESTROGEN RECEPTOR POSITIVE (HCC): Primary | ICD-10-CM

## 2024-08-06 DIAGNOSIS — Z17.0 MALIGNANT NEOPLASM OF UPPER-INNER QUADRANT OF LEFT BREAST IN FEMALE, ESTROGEN RECEPTOR POSITIVE (HCC): Primary | ICD-10-CM

## 2024-08-06 PROCEDURE — 99205 OFFICE O/P NEW HI 60 MIN: CPT | Performed by: SURGERY

## 2024-08-06 PROCEDURE — 1123F ACP DISCUSS/DSCN MKR DOCD: CPT | Performed by: SURGERY

## 2024-08-06 RX ORDER — ACETAMINOPHEN 325 MG/1
650 TABLET ORAL ONCE
Status: CANCELLED | OUTPATIENT
Start: 2024-08-23 | End: 2024-08-23

## 2024-08-06 NOTE — PROGRESS NOTES
2024    Chief Complaint   Patient presents with    Surgical Consult     Referred by : Dr Terry Hilton         HPI Patient underwent a recent screening mammogram and left ultrasound showing scattered areas of fibroglandular density and a 9 mm left breast mass 11:00, 5 cmfn. No suspicious nodes. Core biopsy showed grade 2 invasive ductal carcinoma, ER/IL positive, Her2 negative. She has not felt any breast masses, no breast pain or nipple discharge. Here with her .    Breast History:     Family History of Breast Cancer: n/a  Family History of Other Cancers: Father, lung cancer   Ashkenazi Islam Decent:no   Bra Size: unsure      Gyne History:  : 2  Para: 1  Age of Menarche: 13  Age of Menopause:  Partial hysterectomy in her 30's  Age of first live Birth: 22  History of Hysterectomy / COOPER-BSO: Partial   History of OCP's/HRT: OCP's patient states she took in her 20's unsure for how long   Family History or personal history of Ovarian Cancer: n/a     Lifestyle:  Smoker ( Current / Former ): n/a  ETOH ( alcohol consumption ): none  Exercise: no  Caffeine: 1 cup of tea a few days out of the week   Drug use ( including THC/ Mariajuana )no          Current Outpatient Medications:     losartan (COZAAR) 100 MG tablet, TAKE 1 TABLET DAILY, Disp: 90 tablet, Rfl: 0    atorvastatin (LIPITOR) 20 MG tablet, TAKE 1 TABLET DAILY, Disp: 90 tablet, Rfl: 3    triamterene-hydroCHLOROthiazide (MAXZIDE-25) 37.5-25 MG per tablet, TAKE 1 TABLET DAILY, Disp: 90 tablet, Rfl: 3    amLODIPine (NORVASC) 10 MG tablet, TAKE 1 TABLET DAILY, Disp: 90 tablet, Rfl: 3    escitalopram (LEXAPRO) 10 MG tablet, TAKE 1 TABLET DAILY, Disp: 90 tablet, Rfl: 3    montelukast (SINGULAIR) 10 MG tablet, TAKE 1 TABLET DAILY, Disp: 90 tablet, Rfl: 3    famotidine (PEPCID) 20 MG tablet, TAKE 1 TABLET NIGHTLY, Disp: 90 tablet, Rfl: 3    Semaglutide (RYBELSUS) 3 MG TABS, Take 3 mg by mouth daily, Disp: 30 tablet, Rfl: 0    Semaglutide (RYBELSUS) 3

## 2024-08-06 NOTE — PATIENT INSTRUCTIONS
Pathology: INVASIVE DUCTAL CARCINOMA ( most common type, formed in the milk ducts ) Grade 2 ( medium speed tumor growth )  ER/TN + ( hormone fed tumor )  HER 2 Negative, no up front chemo prior to surgery   Stage 1 A very early, very treatable     Breast exam performed: No palpable masses    REFER TO [facility] FOR PRE AND POST OP INSTRUCTIONS    OBTAIN A PRE OP PHYSICAL FROM YOUR PCP.  IF YOU SEE CARDIOLOGY, THEY WILL NEED TO SEND US A NOTE AS WELL    Refer to [facility] for medical oncology and post op radiation    Plan: RFID localized left breast lumpectomy  Outpatient surgery about 60 minutes, under sedation.

## 2024-08-07 ENCOUNTER — TELEPHONE (OUTPATIENT)
Dept: INTERNAL MEDICINE CLINIC | Age: 79
End: 2024-08-07

## 2024-08-07 NOTE — TELEPHONE ENCOUNTER
----- Message from Corry Barber Barbour sent at 8/7/2024  8:10 AM EDT -----  Regarding: ECC Appointment Request  ECC Appointment Request    Patient needs appointment for ECC Appointment Type: Pre-Op Visit.    Patient Requested Dates(s): Aug 14, 2024   Patient Requested Time: morning   Provider Name: Luis Felipe Hilton MD    Reason for Appointment Request: Established Patient - No appointments available during search - Breast Surgery Date - Aug 23 but will have a procedure on Aug 15 as per patient.   --------------------------------------------------------------------------------------------------------------------------    Relationship to Patient: Self     Call Back Information: OK to leave message on [employer]  Preferred Call Back Number: Phone [phone]/ [phone]            6

## 2024-08-14 ENCOUNTER — OFFICE VISIT (OUTPATIENT)
Dept: CARDIOLOGY CLINIC | Age: 79
End: 2024-08-14
Payer: MEDICARE

## 2024-08-14 ENCOUNTER — NURSE ONLY (OUTPATIENT)
Dept: CARDIOLOGY CLINIC | Age: 79
End: 2024-08-14

## 2024-08-14 VITALS
WEIGHT: 270 LBS | SYSTOLIC BLOOD PRESSURE: 122 MMHG | HEIGHT: 67 IN | HEART RATE: 45 BPM | BODY MASS INDEX: 42.38 KG/M2 | DIASTOLIC BLOOD PRESSURE: 60 MMHG

## 2024-08-14 DIAGNOSIS — E55.9 VITAMIN D DEFICIENCY: ICD-10-CM

## 2024-08-14 DIAGNOSIS — I49.9 CARDIAC ARRHYTHMIA, UNSPECIFIED CARDIAC ARRHYTHMIA TYPE: Primary | ICD-10-CM

## 2024-08-14 DIAGNOSIS — E03.8 TSH DEFICIENCY: ICD-10-CM

## 2024-08-14 DIAGNOSIS — E66.01 OBESITY, CLASS III, BMI 40-49.9 (MORBID OBESITY) (HCC): ICD-10-CM

## 2024-08-14 DIAGNOSIS — R07.9 CHEST PAIN, UNSPECIFIED TYPE: ICD-10-CM

## 2024-08-14 DIAGNOSIS — I50.9 CHRONIC CONGESTIVE HEART FAILURE, UNSPECIFIED HEART FAILURE TYPE (HCC): ICD-10-CM

## 2024-08-14 DIAGNOSIS — E61.2 MAGNESIUM DEFICIENCY: ICD-10-CM

## 2024-08-14 DIAGNOSIS — R55 PRE-SYNCOPE: ICD-10-CM

## 2024-08-14 DIAGNOSIS — R94.31 ABNORMAL ELECTROCARDIOGRAPHY: ICD-10-CM

## 2024-08-14 DIAGNOSIS — R42 DIZZINESS: ICD-10-CM

## 2024-08-14 DIAGNOSIS — Z01.818 PRE-OP TESTING: Primary | ICD-10-CM

## 2024-08-14 DIAGNOSIS — R06.02 SHORTNESS OF BREATH: ICD-10-CM

## 2024-08-14 DIAGNOSIS — R00.1 BRADYCARDIA: ICD-10-CM

## 2024-08-14 PROCEDURE — 99215 OFFICE O/P EST HI 40 MIN: CPT | Performed by: NURSE PRACTITIONER

## 2024-08-14 PROCEDURE — 3074F SYST BP LT 130 MM HG: CPT | Performed by: NURSE PRACTITIONER

## 2024-08-14 PROCEDURE — 1123F ACP DISCUSS/DSCN MKR DOCD: CPT | Performed by: NURSE PRACTITIONER

## 2024-08-14 PROCEDURE — 3078F DIAST BP <80 MM HG: CPT | Performed by: NURSE PRACTITIONER

## 2024-08-14 PROCEDURE — 93000 ELECTROCARDIOGRAM COMPLETE: CPT | Performed by: NURSE PRACTITIONER

## 2024-08-14 NOTE — PROGRESS NOTES
No components found for: \"CHLPL\"  Lab Results   Component Value Date    TRIG 65 09/13/2021    TRIG 93 08/31/2020    TRIG 64 05/17/2019     Lab Results   Component Value Date    HDL 83 (H) 09/02/2022    HDL 73 (H) 09/13/2021    HDL 91 (H) 08/31/2020       Radiology Review:  Pertinent images / reports were reviewed as a part of this visit and reveals the following:    Assessment:     HLD on Lipitor 20    asthma   has some inhalers that she takes periodically      TTE 6/2023  Left ventricular cavity size is normal. There is mildly increased left ventricular wall thickness. Overall left ventricular systolic function appears normal. Ejection fraction is visually estimated to be 55-60%. No regional wall motion abnormalities are noted. Diastolic filling parameters suggest grade I diastolic dysfunction. The right ventricle is normal in size and function.       Obesity  Body mass index is 42.29 kg/m².  Discussed diet activity      Plan   Bradycardia today   EKG today rate 45 sinus  At home she states its as low as 40   c/o occas dizzy denies  presyncope or syncope   b/p wnl / has lower leg edema 1-2 +  not taking maxide per pt /discussed   States to have  breast lumpectomy next Friday ? Doesn't know much about it per pt voiced    Recommend no neg chronotropics / none noted   holter for a week / TTE and lexiscan /labs  Fu in 3-4 weeks   To ED if worsens HR        If you haven't had a visit with Dr. Roque in a year please make your next visit with him.       Leanne Anthony APRN FNP CVNP   [facility]

## 2024-08-15 ENCOUNTER — HOSPITAL ENCOUNTER (OUTPATIENT)
Dept: ULTRASOUND IMAGING | Age: 79
Discharge: HOME | End: 2024-08-15
Payer: MEDICARE

## 2024-08-15 ENCOUNTER — HOSPITAL ENCOUNTER (OUTPATIENT)
Dept: MAMMOGRAPHY | Age: 79
Discharge: HOME | End: 2024-08-15
Payer: MEDICARE

## 2024-08-15 DIAGNOSIS — E55.9 VITAMIN D DEFICIENCY: ICD-10-CM

## 2024-08-15 DIAGNOSIS — E66.01 OBESITY, CLASS III, BMI 40-49.9 (MORBID OBESITY) (HCC): ICD-10-CM

## 2024-08-15 DIAGNOSIS — J45.20 MILD INTERMITTENT EXTRINSIC ASTHMA WITHOUT COMPLICATION: ICD-10-CM

## 2024-08-15 DIAGNOSIS — N63.0 BREAST MASS IN FEMALE: ICD-10-CM

## 2024-08-15 DIAGNOSIS — E03.8 TSH DEFICIENCY: ICD-10-CM

## 2024-08-15 DIAGNOSIS — R07.9 CHEST PAIN, UNSPECIFIED TYPE: ICD-10-CM

## 2024-08-15 DIAGNOSIS — Z01.818 PRE-OP TESTING: ICD-10-CM

## 2024-08-15 DIAGNOSIS — E61.2 MAGNESIUM DEFICIENCY: ICD-10-CM

## 2024-08-15 DIAGNOSIS — R55 PRE-SYNCOPE: ICD-10-CM

## 2024-08-15 DIAGNOSIS — R06.02 SHORTNESS OF BREATH: ICD-10-CM

## 2024-08-15 DIAGNOSIS — I50.9 CHRONIC CONGESTIVE HEART FAILURE, UNSPECIFIED HEART FAILURE TYPE (HCC): ICD-10-CM

## 2024-08-15 DIAGNOSIS — R94.31 ABNORMAL ELECTROCARDIOGRAPHY: ICD-10-CM

## 2024-08-15 DIAGNOSIS — R42 DIZZINESS: ICD-10-CM

## 2024-08-15 PROCEDURE — 77065 DX MAMMO INCL CAD UNI: CPT

## 2024-08-15 PROCEDURE — 19285 PERQ DEV BREAST 1ST US IMAG: CPT

## 2024-08-15 RX ORDER — FAMOTIDINE 20 MG/1
TABLET, FILM COATED ORAL
Qty: 30 TABLET | Refills: 0 | Status: SHIPPED | OUTPATIENT
Start: 2024-08-15

## 2024-08-15 RX ORDER — MONTELUKAST SODIUM 10 MG/1
TABLET ORAL
Qty: 30 TABLET | Refills: 0 | Status: SHIPPED | OUTPATIENT
Start: 2024-08-15

## 2024-08-15 NOTE — TELEPHONE ENCOUNTER
Recent Visits  Date Type Provider Dept   06/28/23 Office Visit Luis Felipe Hilton MD [facility]   03/06/23 Office Visit Luis Felipe Hilton MD [facility]   Showing recent visits within past 540 days with a meds authorizing provider and meeting all other requirements  Future Appointments  Date Type Provider Dept   08/19/24 Appointment Luis Felipe Hilton MD [facility]   10/16/24 Appointment Luis Felipe Hilton MD [facility]   Showing future appointments within next 150 days with a meds authorizing provider and meeting all other requirements     6/28/2023

## 2024-08-16 LAB
25(OH)D3 SERPL-MCNC: 65.6 NG/ML
ALBUMIN SERPL-MCNC: 4.4 G/DL (ref 3.4–5)
ALBUMIN/GLOB SERPL: 1.7 {RATIO} (ref 1.1–2.2)
ALP SERPL-CCNC: 91 U/L (ref 40–129)
ALT SERPL-CCNC: 17 U/L (ref 10–40)
ANION GAP SERPL CALCULATED.3IONS-SCNC: 12 MMOL/L (ref 3–16)
AST SERPL-CCNC: 18 U/L (ref 15–37)
BILIRUB DIRECT SERPL-MCNC: 0.4 MG/DL (ref 0–0.3)
BILIRUB INDIRECT SERPL-MCNC: 0.7 MG/DL (ref 0–1)
BILIRUB SERPL-MCNC: 1.1 MG/DL (ref 0–1)
BUN SERPL-MCNC: 15 MG/DL (ref 7–20)
CALCIUM SERPL-MCNC: 10 MG/DL (ref 8.3–10.6)
CHLORIDE SERPL-SCNC: 102 MMOL/L (ref 99–110)
CHOLEST SERPL-MCNC: 170 MG/DL (ref 0–199)
CO2 SERPL-SCNC: 24 MMOL/L (ref 21–32)
CREAT SERPL-MCNC: 0.9 MG/DL (ref 0.6–1.2)
DEPRECATED RDW RBC AUTO: 15.4 % (ref 12.4–15.4)
GFR SERPLBLD CREATININE-BSD FMLA CKD-EPI: 65 ML/MIN/{1.73_M2}
GLUCOSE SERPL-MCNC: 92 MG/DL (ref 70–99)
HCT VFR BLD AUTO: 40.1 % (ref 36–48)
HDLC SERPL-MCNC: 83 MG/DL (ref 40–60)
HGB BLD-MCNC: 13.4 G/DL (ref 12–16)
LDLC SERPL CALC-MCNC: 74 MG/DL
MAGNESIUM SERPL-MCNC: 1.89 MG/DL (ref 1.8–2.4)
MCH RBC QN AUTO: 29.8 PG (ref 26–34)
MCHC RBC AUTO-ENTMCNC: 33.5 G/DL (ref 31–36)
MCV RBC AUTO: 89 FL (ref 80–100)
PLATELET # BLD AUTO: 267 K/UL (ref 135–450)
PMV BLD AUTO: 8.3 FL (ref 5–10.5)
POTASSIUM SERPL-SCNC: 4.7 MMOL/L (ref 3.5–5.1)
PROT SERPL-MCNC: 7 G/DL (ref 6.4–8.2)
RBC # BLD AUTO: 4.5 M/UL (ref 4–5.2)
SODIUM SERPL-SCNC: 138 MMOL/L (ref 136–145)
TRIGL SERPL-MCNC: 64 MG/DL (ref 0–150)
TSH SERPL DL<=0.005 MIU/L-ACNC: 2.82 UIU/ML (ref 0.27–4.2)
VLDLC SERPL CALC-MCNC: 13 MG/DL
WBC # BLD AUTO: 4.9 K/UL (ref 4–11)

## 2024-08-19 ENCOUNTER — OFFICE VISIT (OUTPATIENT)
Dept: INTERNAL MEDICINE CLINIC | Age: 79
End: 2024-08-19

## 2024-08-19 ENCOUNTER — TELEPHONE (OUTPATIENT)
Dept: SURGERY | Age: 79
End: 2024-08-19

## 2024-08-19 ENCOUNTER — TELEPHONE (OUTPATIENT)
Dept: CARDIOLOGY CLINIC | Age: 79
End: 2024-08-19

## 2024-08-19 VITALS
DIASTOLIC BLOOD PRESSURE: 72 MMHG | SYSTOLIC BLOOD PRESSURE: 158 MMHG | HEART RATE: 54 BPM | TEMPERATURE: 97.5 F | HEIGHT: 67 IN | WEIGHT: 267 LBS | BODY MASS INDEX: 41.91 KG/M2 | OXYGEN SATURATION: 99 %

## 2024-08-19 DIAGNOSIS — F33.1 MAJOR DEPRESSIVE DISORDER, RECURRENT, MODERATE (HCC): ICD-10-CM

## 2024-08-19 DIAGNOSIS — M15.9 PRIMARY OSTEOARTHRITIS INVOLVING MULTIPLE JOINTS: ICD-10-CM

## 2024-08-19 DIAGNOSIS — R00.1 BRADYCARDIA BY ELECTROCARDIOGRAM: ICD-10-CM

## 2024-08-19 DIAGNOSIS — I15.2 OBESITY, DIABETES, AND HYPERTENSION SYNDROME (HCC): ICD-10-CM

## 2024-08-19 DIAGNOSIS — E11.59 OBESITY, DIABETES, AND HYPERTENSION SYNDROME (HCC): ICD-10-CM

## 2024-08-19 DIAGNOSIS — J45.20 MILD INTERMITTENT EXTRINSIC ASTHMA WITHOUT COMPLICATION: Primary | ICD-10-CM

## 2024-08-19 DIAGNOSIS — Z17.0 MALIGNANT NEOPLASM OF UPPER-INNER QUADRANT OF LEFT BREAST IN FEMALE, ESTROGEN RECEPTOR POSITIVE (HCC): Primary | ICD-10-CM

## 2024-08-19 DIAGNOSIS — E66.9 OBESITY, DIABETES, AND HYPERTENSION SYNDROME (HCC): ICD-10-CM

## 2024-08-19 DIAGNOSIS — E11.69 OBESITY, DIABETES, AND HYPERTENSION SYNDROME (HCC): ICD-10-CM

## 2024-08-19 DIAGNOSIS — C50.212 MALIGNANT NEOPLASM OF UPPER-INNER QUADRANT OF LEFT BREAST IN FEMALE, ESTROGEN RECEPTOR POSITIVE (HCC): Primary | ICD-10-CM

## 2024-08-19 PROBLEM — F33.9 MAJOR DEPRESSIVE DISORDER, RECURRENT, UNSPECIFIED (HCC): Status: RESOLVED | Noted: 2023-09-01 | Resolved: 2024-08-19

## 2024-08-19 PROBLEM — F33.0 MAJOR DEPRESSIVE DISORDER, RECURRENT, MILD (HCC): Status: RESOLVED | Noted: 2023-09-01 | Resolved: 2024-08-19

## 2024-08-19 PROBLEM — E11.9 TYPE 2 DIABETES MELLITUS (HCC): Status: RESOLVED | Noted: 2023-09-01 | Resolved: 2024-08-19

## 2024-08-19 RX ORDER — MELOXICAM 7.5 MG/1
7.5 TABLET ORAL DAILY PRN
Qty: 30 TABLET | Refills: 5 | Status: SHIPPED | OUTPATIENT
Start: 2024-08-19

## 2024-08-19 RX ORDER — MELOXICAM 7.5 MG/1
7.5 TABLET ORAL DAILY PRN
Qty: 30 TABLET | Refills: 5 | Status: SHIPPED | OUTPATIENT
Start: 2024-08-19 | End: 2024-08-19

## 2024-08-19 RX ORDER — ALPRAZOLAM 0.5 MG/1
0.5 TABLET ORAL 3 TIMES DAILY PRN
Qty: 30 TABLET | Refills: 0 | Status: SHIPPED | OUTPATIENT
Start: 2024-08-19 | End: 2024-09-18

## 2024-08-19 RX ORDER — ALPRAZOLAM 0.5 MG/1
0.5 TABLET ORAL 3 TIMES DAILY PRN
Qty: 30 TABLET | Refills: 0 | Status: SHIPPED | OUTPATIENT
Start: 2024-08-19 | End: 2024-08-19

## 2024-08-19 SDOH — ECONOMIC STABILITY: FOOD INSECURITY: WITHIN THE PAST 12 MONTHS, YOU WORRIED THAT YOUR FOOD WOULD RUN OUT BEFORE YOU GOT MONEY TO BUY MORE.: NEVER TRUE

## 2024-08-19 SDOH — ECONOMIC STABILITY: INCOME INSECURITY: HOW HARD IS IT FOR YOU TO PAY FOR THE VERY BASICS LIKE FOOD, HOUSING, MEDICAL CARE, AND HEATING?: NOT HARD AT ALL

## 2024-08-19 SDOH — ECONOMIC STABILITY: FOOD INSECURITY: WITHIN THE PAST 12 MONTHS, THE FOOD YOU BOUGHT JUST DIDN'T LAST AND YOU DIDN'T HAVE MONEY TO GET MORE.: NEVER TRUE

## 2024-08-19 ASSESSMENT — PATIENT HEALTH QUESTIONNAIRE - PHQ9
4. FEELING TIRED OR HAVING LITTLE ENERGY: SEVERAL DAYS
SUM OF ALL RESPONSES TO PHQ QUESTIONS 1-9: 2
10. IF YOU CHECKED OFF ANY PROBLEMS, HOW DIFFICULT HAVE THESE PROBLEMS MADE IT FOR YOU TO DO YOUR WORK, TAKE CARE OF THINGS AT HOME, OR GET ALONG WITH OTHER PEOPLE: NOT DIFFICULT AT ALL
SUM OF ALL RESPONSES TO PHQ9 QUESTIONS 1 & 2: 0
1. LITTLE INTEREST OR PLEASURE IN DOING THINGS: NOT AT ALL
6. FEELING BAD ABOUT YOURSELF - OR THAT YOU ARE A FAILURE OR HAVE LET YOURSELF OR YOUR FAMILY DOWN: NOT AT ALL
SUM OF ALL RESPONSES TO PHQ QUESTIONS 1-9: 2
7. TROUBLE CONCENTRATING ON THINGS, SUCH AS READING THE NEWSPAPER OR WATCHING TELEVISION: NOT AT ALL
5. POOR APPETITE OR OVEREATING: SEVERAL DAYS
SUM OF ALL RESPONSES TO PHQ QUESTIONS 1-9: 2
8. MOVING OR SPEAKING SO SLOWLY THAT OTHER PEOPLE COULD HAVE NOTICED. OR THE OPPOSITE, BEING SO FIGETY OR RESTLESS THAT YOU HAVE BEEN MOVING AROUND A LOT MORE THAN USUAL: NOT AT ALL
2. FEELING DOWN, DEPRESSED OR HOPELESS: NOT AT ALL
3. TROUBLE FALLING OR STAYING ASLEEP: NOT AT ALL
SUM OF ALL RESPONSES TO PHQ QUESTIONS 1-9: 2
9. THOUGHTS THAT YOU WOULD BE BETTER OFF DEAD, OR OF HURTING YOURSELF: NOT AT ALL

## 2024-08-19 ASSESSMENT — ANXIETY QUESTIONNAIRES
1. FEELING NERVOUS, ANXIOUS, OR ON EDGE: NOT AT ALL
5. BEING SO RESTLESS THAT IT IS HARD TO SIT STILL: NOT AT ALL
IF YOU CHECKED OFF ANY PROBLEMS ON THIS QUESTIONNAIRE, HOW DIFFICULT HAVE THESE PROBLEMS MADE IT FOR YOU TO DO YOUR WORK, TAKE CARE OF THINGS AT HOME, OR GET ALONG WITH OTHER PEOPLE: NOT DIFFICULT AT ALL
4. TROUBLE RELAXING: NOT AT ALL
2. NOT BEING ABLE TO STOP OR CONTROL WORRYING: NOT AT ALL
7. FEELING AFRAID AS IF SOMETHING AWFUL MIGHT HAPPEN: NOT AT ALL
3. WORRYING TOO MUCH ABOUT DIFFERENT THINGS: NOT AT ALL
GAD7 TOTAL SCORE: 0
6. BECOMING EASILY ANNOYED OR IRRITABLE: NOT AT ALL

## 2024-08-19 NOTE — PROGRESS NOTES
10:19 AM 3/6/2023    10:29 AM 9/2/2022     9:51 AM 9/1/2021     9:53 AM 3/1/2021    10:46 AM 8/31/2020     9:41 AM   PHQ Scores   PHQ2 Score 0 0 0 0 0 0 0   PHQ9 Score 2 0 0 0 0 0 0     Interpretation of Total Score Depression Severity: 1-4 = Minimal depression, 5-9 = Mild depression, 10-14 = Moderate depression, 15-19 = Moderately severe depression, 20-27 = Severe depression    Luis Felipe Hilton MD  FACP

## 2024-08-19 NOTE — TELEPHONE ENCOUNTER
Cardiac Risk Assessment  for Procedures and Surgery    What type of procedure are you having: Breast Lumpectomy    When is your procedure scheduled for:8/30/24    [facility]    Phone Number: [phone]    Fax number to send the letter:[phone]-Send through [facility]      Cardiologist Dr. Roque    Last Appointment: 3/21/23    Next Appointment: N/A    Are you on any blood thinners: No                   If yes what?    History of Coronary Artery Disease:    Last Stress test:    Last echo:    Device type and :

## 2024-08-20 ENCOUNTER — TELEPHONE (OUTPATIENT)
Dept: INTERNAL MEDICINE CLINIC | Age: 79
End: 2024-08-20

## 2024-08-20 ENCOUNTER — TELEPHONE (OUTPATIENT)
Dept: ADMINISTRATIVE | Age: 79
End: 2024-08-20

## 2024-08-20 NOTE — TELEPHONE ENCOUNTER
Please clarify. Patient is scheduled with her surgeon on 8/23 which is why she had a pre op on 8/19.

## 2024-08-20 NOTE — TELEPHONE ENCOUNTER
ipratropium (ATROVENT HFA) 17 MCG/ACT inhaler     Pharmacy says insurance won't pay for it and would cost her $600.  Pt needs another med prescribed.       Please advise

## 2024-08-20 NOTE — TELEPHONE ENCOUNTER
Pt wanted to let Dr Hilton know that she was able to move up the testing for surgery to 8/29 at [facility]. She doesn't have a set date for the surgery yet.     Pt wants to know if she needs to see Dr Hilton soon? Or when?    Pt is scheduled in Oct. But that was already scheduled.     She had a pre op 8/19 with Dr Hilton    Please advise

## 2024-08-20 NOTE — TELEPHONE ENCOUNTER
Called patient and she no longer needs cardiac clearance until she has all testing done, than they will reach out to us.

## 2024-08-21 NOTE — TELEPHONE ENCOUNTER
Submitted PA for Atrovent HFA 17MCG/ACT aerosol  Via CMM Key: [id number] STATUS: PENDING.    Follow up done daily; if no decision with in three days we will refax.  If another three days goes by with no decision will call the insurance for status.

## 2024-08-21 NOTE — TELEPHONE ENCOUNTER
Pt called, says pharmacy is saying needs authorization from Dr Hilton? Or does it need a PA?  Pt not sure.

## 2024-08-22 NOTE — TELEPHONE ENCOUNTER
Pt already spoke to Dr Hilton.  The breast surgery till after her testing at [facility] for her heart, irregular heart beat on 8/29. Dr Hilton said to wait till the results of that test.

## 2024-08-22 NOTE — TELEPHONE ENCOUNTER
Per [facility]. Your PA request cannot be processed for the member plan submitted. So I called [employer]. Hoa said patient only has Medicare Part B coverage. She doesn't have Medicare part D pharmacy benefit. Please contact patient and verify pharmacy benefit and demographics.I only do PA's for Medicare Part D. Sabine advise.    If this requires a response please respond to the pool. (P [facility] MEDICINE Pre-Auth).    Please advise patient thank you.

## 2024-08-22 NOTE — TELEPHONE ENCOUNTER
Added clinicals and submitted to the plan.  
Submitted PA for ALPRAZolam 0.5MG tablets   Via CMM Key: [id number]  STATUS: not sent    I need last office note completed so I can send with the PA. Please advise.    If this requires a response please respond to the pool. (P [facility] MEDICINE Pre-Auth).    Please advise patient thank you.    
The medication was DENIED; DENIAL letter uploaded to MEDIA.    If you want an APPEAL; please note in this encounter what new information you would like to APPEAL with.  Once complete route back to PA POOL.    If this requires a response please respond to the pool ( P [facility] MEDICATION PRE-AUTH).      Thank you please advise patient.    
Detail Level: Zone
Detail Level: Detailed

## 2024-08-29 ENCOUNTER — HOSPITAL ENCOUNTER (OUTPATIENT)
Age: 79
Discharge: HOME | End: 2024-08-31
Payer: MEDICARE

## 2024-08-29 ENCOUNTER — HOSPITAL ENCOUNTER (OUTPATIENT)
Dept: NUCLEAR MEDICINE | Age: 79
Discharge: HOME | End: 2024-08-29
Payer: MEDICARE

## 2024-08-29 VITALS
DIASTOLIC BLOOD PRESSURE: 78 MMHG | SYSTOLIC BLOOD PRESSURE: 158 MMHG | HEIGHT: 67 IN | BODY MASS INDEX: 41.91 KG/M2 | WEIGHT: 267 LBS

## 2024-08-29 DIAGNOSIS — R06.02 SHORTNESS OF BREATH: ICD-10-CM

## 2024-08-29 DIAGNOSIS — I50.9 CHRONIC CONGESTIVE HEART FAILURE, UNSPECIFIED HEART FAILURE TYPE (HCC): ICD-10-CM

## 2024-08-29 DIAGNOSIS — R94.31 ABNORMAL ELECTROCARDIOGRAPHY: ICD-10-CM

## 2024-08-29 DIAGNOSIS — R07.9 CHEST PAIN, UNSPECIFIED TYPE: ICD-10-CM

## 2024-08-29 LAB
ECHO AO ARCH DIAM: 2.4 CM
ECHO AO ASC DIAM: 3.2 CM
ECHO AO ASCENDING AORTA INDEX: 1.4 CM/M2
ECHO AO ROOT DIAM: 2.8 CM
ECHO AO ROOT INDEX: 1.22 CM/M2
ECHO AV AREA PEAK VELOCITY: 2.9 CM2
ECHO AV AREA VTI: 3.3 CM2
ECHO AV AREA/BSA PEAK VELOCITY: 1.3 CM2/M2
ECHO AV AREA/BSA VTI: 1.4 CM2/M2
ECHO AV MEAN GRADIENT: 3 MMHG
ECHO AV MEAN VELOCITY: 0.7 M/S
ECHO AV PEAK GRADIENT: 4 MMHG
ECHO AV PEAK VELOCITY: 1 M/S
ECHO AV VELOCITY RATIO: 0.8
ECHO AV VTI: 24.8 CM
ECHO BSA: 2.39 M2
ECHO BSA: 2.39 M2
ECHO IVC PROX: 1.5 CM
ECHO LA AREA 2C: 17.3 CM2
ECHO LA AREA 4C: 18.2 CM2
ECHO LA DIAMETER INDEX: 1.7 CM/M2
ECHO LA DIAMETER: 3.9 CM
ECHO LA MAJOR AXIS: 4.6 CM
ECHO LA MINOR AXIS: 4.6 CM
ECHO LA TO AORTIC ROOT RATIO: 1.39
ECHO LA VOL BP: 53 ML (ref 22–52)
ECHO LA VOL MOD A2C: 51 ML (ref 22–52)
ECHO LA VOL MOD A4C: 55 ML (ref 22–52)
ECHO LA VOL/BSA BIPLANE: 23 ML/M2 (ref 16–34)
ECHO LA VOLUME INDEX MOD A2C: 22 ML/M2 (ref 16–34)
ECHO LA VOLUME INDEX MOD A4C: 24 ML/M2 (ref 16–34)
ECHO LV E' LATERAL VELOCITY: 11 CM/S
ECHO LV E' SEPTAL VELOCITY: 13 CM/S
ECHO LV EF PHYSICIAN: 57 %
ECHO LV FRACTIONAL SHORTENING: 33 % (ref 28–44)
ECHO LV INTERNAL DIMENSION DIASTOLE INDEX: 2.23 CM/M2
ECHO LV INTERNAL DIMENSION DIASTOLIC: 5.1 CM (ref 3.9–5.3)
ECHO LV INTERNAL DIMENSION SYSTOLIC INDEX: 1.48 CM/M2
ECHO LV INTERNAL DIMENSION SYSTOLIC: 3.4 CM
ECHO LV IVSD: 1.1 CM (ref 0.6–0.9)
ECHO LV MASS 2D: 200.8 G (ref 67–162)
ECHO LV MASS INDEX 2D: 87.7 G/M2 (ref 43–95)
ECHO LV POSTERIOR WALL DIASTOLIC: 1 CM (ref 0.6–0.9)
ECHO LV RELATIVE WALL THICKNESS RATIO: 0.39
ECHO LVOT AREA: 3.8 CM2
ECHO LVOT AV VTI INDEX: 0.88
ECHO LVOT DIAM: 2.2 CM
ECHO LVOT MEAN GRADIENT: 1 MMHG
ECHO LVOT PEAK GRADIENT: 2 MMHG
ECHO LVOT PEAK VELOCITY: 0.8 M/S
ECHO LVOT STROKE VOLUME INDEX: 36 ML/M2
ECHO LVOT SV: 82.4 ML
ECHO LVOT VTI: 21.7 CM
ECHO MV A VELOCITY: 0.58 M/S
ECHO MV E VELOCITY: 0.66 M/S
ECHO MV E/A RATIO: 1.14
ECHO MV E/E' LATERAL: 6
ECHO MV E/E' RATIO (AVERAGED): 5.54
ECHO MV E/E' SEPTAL: 5.08
ECHO PV MAX VELOCITY: 1.1 M/S
ECHO PV MEAN GRADIENT: 2 MMHG
ECHO PV MEAN VELOCITY: 0.7 M/S
ECHO PV PEAK GRADIENT: 5 MMHG
ECHO PV VTI: 28.6 CM
ECHO RA AREA 4C: 13.1 CM2
ECHO RA END SYSTOLIC VOLUME APICAL 4 CHAMBER INDEX BSA: 13 ML/M2
ECHO RA VOLUME: 30 ML
ECHO RV BASAL DIMENSION: 3.3 CM
ECHO RV FREE WALL PEAK S': 15 CM/S
ECHO RV LONGITUDINAL DIMENSION: 7.5 CM
ECHO RV MID DIMENSION: 3.2 CM
ECHO RV TAPSE: 2.6 CM (ref 1.7–?)
NUC STRESS EJECTION FRACTION: 68 %
NUC STRESS LV EDV: 121 ML (ref 56–104)
NUC STRESS LV ESV: 39 ML (ref 19–49)
NUC STRESS LV MASS: 142 G
NUC STRESS LV STROKE VOLUME: 82 ML
STRESS BASELINE DIAS BP: 72 MMHG
STRESS BASELINE HR: 43 BPM
STRESS BASELINE SYS BP: 119 MMHG
STRESS ESTIMATED WORKLOAD: 1 METS
STRESS EXERCISE DUR MIN: 1 MIN
STRESS EXERCISE DUR SEC: 0 SEC
STRESS PEAK DIAS BP: 64 MMHG
STRESS PEAK SYS BP: 137 MMHG
STRESS PERCENT HR ACHIEVED: 52 %
STRESS POST PEAK HR: 74 BPM
STRESS RATE PRESSURE PRODUCT: ABNORMAL BPM*MMHG
STRESS TARGET HR: 142 BPM

## 2024-08-29 PROCEDURE — 93306 TTE W/DOPPLER COMPLETE: CPT

## 2024-08-29 PROCEDURE — A9502 TC99M TETROFOSMIN: HCPCS | Performed by: NURSE PRACTITIONER

## 2024-08-29 PROCEDURE — 78452 HT MUSCLE IMAGE SPECT MULT: CPT

## 2024-08-29 PROCEDURE — 93017 CV STRESS TEST TRACING ONLY: CPT

## 2024-08-29 PROCEDURE — 6360000002 HC RX W HCPCS: Performed by: NURSE PRACTITIONER

## 2024-08-29 PROCEDURE — 3430000000 HC RX DIAGNOSTIC RADIOPHARMACEUTICAL: Performed by: NURSE PRACTITIONER

## 2024-08-29 RX ORDER — REGADENOSON 0.08 MG/ML
0.4 INJECTION, SOLUTION INTRAVENOUS
Status: COMPLETED | OUTPATIENT
Start: 2024-08-29 | End: 2024-08-29

## 2024-08-29 RX ADMIN — REGADENOSON 0.4 MG: 0.08 INJECTION, SOLUTION INTRAVENOUS at 14:03

## 2024-08-29 RX ADMIN — TETROFOSMIN 8.81 MILLICURIE: 1.38 INJECTION, POWDER, LYOPHILIZED, FOR SOLUTION INTRAVENOUS at 11:10

## 2024-08-29 RX ADMIN — TETROFOSMIN 35 MILLICURIE: 1.38 INJECTION, POWDER, LYOPHILIZED, FOR SOLUTION INTRAVENOUS at 14:03

## 2024-08-30 ENCOUNTER — TELEPHONE (OUTPATIENT)
Dept: SURGERY | Age: 79
End: 2024-08-30

## 2024-08-30 ENCOUNTER — PREP FOR PROCEDURE (OUTPATIENT)
Dept: SURGERY | Age: 79
End: 2024-08-30

## 2024-08-30 RX ORDER — ACETAMINOPHEN 325 MG/1
650 TABLET ORAL ONCE
Status: CANCELLED | OUTPATIENT
Start: 2024-09-13 | End: 2024-08-30

## 2024-09-09 ENCOUNTER — TELEPHONE (OUTPATIENT)
Dept: SURGERY | Age: 79
End: 2024-09-09

## 2024-09-10 ENCOUNTER — HOSPITAL ENCOUNTER (OUTPATIENT)
Dept: GENERAL RADIOLOGY | Age: 79
Discharge: HOME | End: 2024-09-10
Attending: INTERNAL MEDICINE
Payer: MEDICARE

## 2024-09-10 DIAGNOSIS — C50.212 MALIGNANT NEOPLASM OF UPPER-INNER QUADRANT OF LEFT BREAST IN FEMALE, ESTROGEN RECEPTOR POSITIVE (HCC): ICD-10-CM

## 2024-09-10 DIAGNOSIS — Z17.0 MALIGNANT NEOPLASM OF UPPER-INNER QUADRANT OF LEFT BREAST IN FEMALE, ESTROGEN RECEPTOR POSITIVE (HCC): ICD-10-CM

## 2024-09-10 DIAGNOSIS — Z51.11 ENCOUNTER FOR ANTINEOPLASTIC CHEMOTHERAPY: ICD-10-CM

## 2024-09-10 PROCEDURE — 77080 DXA BONE DENSITY AXIAL: CPT

## 2024-09-11 ENCOUNTER — ANESTHESIA EVENT (OUTPATIENT)
Dept: OPERATING ROOM | Age: 79
End: 2024-09-11
Payer: MEDICARE

## 2024-09-13 ENCOUNTER — ANESTHESIA (OUTPATIENT)
Dept: OPERATING ROOM | Age: 79
End: 2024-09-13
Payer: MEDICARE

## 2024-09-13 ENCOUNTER — APPOINTMENT (OUTPATIENT)
Dept: MAMMOGRAPHY | Age: 79
End: 2024-09-13
Attending: SURGERY
Payer: MEDICARE

## 2024-09-13 ENCOUNTER — HOSPITAL ENCOUNTER (OUTPATIENT)
Age: 79
Setting detail: OUTPATIENT SURGERY
Discharge: HOME | End: 2024-09-13
Attending: SURGERY | Admitting: SURGERY
Payer: MEDICARE

## 2024-09-13 VITALS
HEART RATE: 53 BPM | HEIGHT: 67 IN | BODY MASS INDEX: 42.69 KG/M2 | OXYGEN SATURATION: 95 % | TEMPERATURE: 97 F | DIASTOLIC BLOOD PRESSURE: 72 MMHG | RESPIRATION RATE: 16 BRPM | WEIGHT: 272 LBS | SYSTOLIC BLOOD PRESSURE: 147 MMHG

## 2024-09-13 DIAGNOSIS — Z17.0 MALIGNANT NEOPLASM OF UPPER-INNER QUADRANT OF LEFT BREAST IN FEMALE, ESTROGEN RECEPTOR POSITIVE (HCC): ICD-10-CM

## 2024-09-13 DIAGNOSIS — C50.212 MALIGNANT NEOPLASM OF UPPER-INNER QUADRANT OF LEFT BREAST IN FEMALE, ESTROGEN RECEPTOR POSITIVE (HCC): ICD-10-CM

## 2024-09-13 LAB
GLUCOSE BLD-MCNC: 89 MG/DL (ref 70–99)
PERFORMED ON: NORMAL

## 2024-09-13 PROCEDURE — 6360000002 HC RX W HCPCS: Performed by: ANESTHESIOLOGY

## 2024-09-13 PROCEDURE — A4217 STERILE WATER/SALINE, 500 ML: HCPCS | Performed by: SURGERY

## 2024-09-13 PROCEDURE — 3700000001 HC ADD 15 MINUTES (ANESTHESIA): Performed by: SURGERY

## 2024-09-13 PROCEDURE — 7100000011 HC PHASE II RECOVERY - ADDTL 15 MIN: Performed by: SURGERY

## 2024-09-13 PROCEDURE — 7100000001 HC PACU RECOVERY - ADDTL 15 MIN: Performed by: SURGERY

## 2024-09-13 PROCEDURE — 2700000000 HC OXYGEN THERAPY PER DAY

## 2024-09-13 PROCEDURE — 7100000000 HC PACU RECOVERY - FIRST 15 MIN: Performed by: SURGERY

## 2024-09-13 PROCEDURE — 6360000002 HC RX W HCPCS: Performed by: SURGERY

## 2024-09-13 PROCEDURE — 88307 TISSUE EXAM BY PATHOLOGIST: CPT

## 2024-09-13 PROCEDURE — 88305 TISSUE EXAM BY PATHOLOGIST: CPT

## 2024-09-13 PROCEDURE — 6370000000 HC RX 637 (ALT 250 FOR IP): Performed by: SURGERY

## 2024-09-13 PROCEDURE — 2709999900 HC NON-CHARGEABLE SUPPLY: Performed by: SURGERY

## 2024-09-13 PROCEDURE — 76098 X-RAY EXAM SURGICAL SPECIMEN: CPT

## 2024-09-13 PROCEDURE — 7100000010 HC PHASE II RECOVERY - FIRST 15 MIN: Performed by: SURGERY

## 2024-09-13 PROCEDURE — 94761 N-INVAS EAR/PLS OXIMETRY MLT: CPT

## 2024-09-13 PROCEDURE — 2720000010 HC SURG SUPPLY STERILE: Performed by: SURGERY

## 2024-09-13 PROCEDURE — 3600000004 HC SURGERY LEVEL 4 BASE: Performed by: SURGERY

## 2024-09-13 PROCEDURE — 19301 PARTIAL MASTECTOMY: CPT | Performed by: SURGERY

## 2024-09-13 PROCEDURE — 2580000003 HC RX 258: Performed by: ANESTHESIOLOGY

## 2024-09-13 PROCEDURE — 94640 AIRWAY INHALATION TREATMENT: CPT

## 2024-09-13 PROCEDURE — 6360000002 HC RX W HCPCS: Performed by: NURSE ANESTHETIST, CERTIFIED REGISTERED

## 2024-09-13 PROCEDURE — 2500000003 HC RX 250 WO HCPCS: Performed by: NURSE ANESTHETIST, CERTIFIED REGISTERED

## 2024-09-13 PROCEDURE — 2580000003 HC RX 258: Performed by: SURGERY

## 2024-09-13 PROCEDURE — 3700000000 HC ANESTHESIA ATTENDED CARE: Performed by: SURGERY

## 2024-09-13 PROCEDURE — 3600000014 HC SURGERY LEVEL 4 ADDTL 15MIN: Performed by: SURGERY

## 2024-09-13 RX ORDER — HYDROMORPHONE HYDROCHLORIDE 2 MG/ML
INJECTION, SOLUTION INTRAMUSCULAR; INTRAVENOUS; SUBCUTANEOUS
Status: DISCONTINUED | OUTPATIENT
Start: 2024-09-13 | End: 2024-09-13 | Stop reason: SDUPTHER

## 2024-09-13 RX ORDER — HYDROMORPHONE HYDROCHLORIDE 1 MG/ML
0.5 INJECTION, SOLUTION INTRAMUSCULAR; INTRAVENOUS; SUBCUTANEOUS EVERY 5 MIN PRN
Status: DISCONTINUED | OUTPATIENT
Start: 2024-09-13 | End: 2024-09-13 | Stop reason: HOSPADM

## 2024-09-13 RX ORDER — ONDANSETRON 2 MG/ML
4 INJECTION INTRAMUSCULAR; INTRAVENOUS EVERY 6 HOURS PRN
Status: DISCONTINUED | OUTPATIENT
Start: 2024-09-13 | End: 2024-09-13 | Stop reason: HOSPADM

## 2024-09-13 RX ORDER — HYDROCODONE BITARTRATE AND ACETAMINOPHEN 5; 325 MG/1; MG/1
1 TABLET ORAL
Status: DISCONTINUED | OUTPATIENT
Start: 2024-09-13 | End: 2024-09-13 | Stop reason: HOSPADM

## 2024-09-13 RX ORDER — OXYCODONE HYDROCHLORIDE 5 MG/1
10 TABLET ORAL PRN
Status: DISCONTINUED | OUTPATIENT
Start: 2024-09-13 | End: 2024-09-13 | Stop reason: HOSPADM

## 2024-09-13 RX ORDER — ROCURONIUM BROMIDE 10 MG/ML
INJECTION, SOLUTION INTRAVENOUS
Status: DISCONTINUED | OUTPATIENT
Start: 2024-09-13 | End: 2024-09-13 | Stop reason: SDUPTHER

## 2024-09-13 RX ORDER — ONDANSETRON 4 MG/1
4 TABLET, FILM COATED ORAL 3 TIMES DAILY PRN
Qty: 8 TABLET | Refills: 0 | Status: SHIPPED | OUTPATIENT
Start: 2024-09-13

## 2024-09-13 RX ORDER — SODIUM CHLORIDE, SODIUM LACTATE, POTASSIUM CHLORIDE, CALCIUM CHLORIDE 600; 310; 30; 20 MG/100ML; MG/100ML; MG/100ML; MG/100ML
INJECTION, SOLUTION INTRAVENOUS CONTINUOUS
Status: DISCONTINUED | OUTPATIENT
Start: 2024-09-13 | End: 2024-09-13 | Stop reason: HOSPADM

## 2024-09-13 RX ORDER — NALOXONE HYDROCHLORIDE 0.4 MG/ML
INJECTION, SOLUTION INTRAMUSCULAR; INTRAVENOUS; SUBCUTANEOUS PRN
Status: DISCONTINUED | OUTPATIENT
Start: 2024-09-13 | End: 2024-09-13 | Stop reason: HOSPADM

## 2024-09-13 RX ORDER — SODIUM CHLORIDE 0.9 % (FLUSH) 0.9 %
5-40 SYRINGE (ML) INJECTION EVERY 12 HOURS SCHEDULED
Status: DISCONTINUED | OUTPATIENT
Start: 2024-09-13 | End: 2024-09-13 | Stop reason: HOSPADM

## 2024-09-13 RX ORDER — DEXAMETHASONE SODIUM PHOSPHATE 4 MG/ML
INJECTION, SOLUTION INTRA-ARTICULAR; INTRALESIONAL; INTRAMUSCULAR; INTRAVENOUS; SOFT TISSUE
Status: DISCONTINUED | OUTPATIENT
Start: 2024-09-13 | End: 2024-09-13 | Stop reason: SDUPTHER

## 2024-09-13 RX ORDER — MAGNESIUM HYDROXIDE 1200 MG/15ML
LIQUID ORAL CONTINUOUS PRN
Status: DISCONTINUED | OUTPATIENT
Start: 2024-09-13 | End: 2024-09-13 | Stop reason: HOSPADM

## 2024-09-13 RX ORDER — GLYCOPYRROLATE 0.2 MG/ML
0.2 INJECTION INTRAMUSCULAR; INTRAVENOUS ONCE
Status: COMPLETED | OUTPATIENT
Start: 2024-09-13 | End: 2024-09-13

## 2024-09-13 RX ORDER — HYDRALAZINE HYDROCHLORIDE 20 MG/ML
10 INJECTION INTRAMUSCULAR; INTRAVENOUS
Status: DISCONTINUED | OUTPATIENT
Start: 2024-09-13 | End: 2024-09-13 | Stop reason: HOSPADM

## 2024-09-13 RX ORDER — SUCCINYLCHOLINE/SOD CL,ISO/PF 200MG/10ML
SYRINGE (ML) INTRAVENOUS
Status: DISCONTINUED | OUTPATIENT
Start: 2024-09-13 | End: 2024-09-13 | Stop reason: SDUPTHER

## 2024-09-13 RX ORDER — PROPOFOL 10 MG/ML
INJECTION, EMULSION INTRAVENOUS
Status: DISCONTINUED | OUTPATIENT
Start: 2024-09-13 | End: 2024-09-13 | Stop reason: SDUPTHER

## 2024-09-13 RX ORDER — ALBUTEROL SULFATE 0.83 MG/ML
2.5 SOLUTION RESPIRATORY (INHALATION)
Status: COMPLETED | OUTPATIENT
Start: 2024-09-13 | End: 2024-09-13

## 2024-09-13 RX ORDER — FENTANYL CITRATE 50 UG/ML
25 INJECTION, SOLUTION INTRAMUSCULAR; INTRAVENOUS EVERY 5 MIN PRN
Status: DISCONTINUED | OUTPATIENT
Start: 2024-09-13 | End: 2024-09-13 | Stop reason: HOSPADM

## 2024-09-13 RX ORDER — LIDOCAINE HYDROCHLORIDE 20 MG/ML
INJECTION, SOLUTION INTRAVENOUS
Status: DISCONTINUED | OUTPATIENT
Start: 2024-09-13 | End: 2024-09-13 | Stop reason: SDUPTHER

## 2024-09-13 RX ORDER — LABETALOL HYDROCHLORIDE 5 MG/ML
10 INJECTION, SOLUTION INTRAVENOUS
Status: DISCONTINUED | OUTPATIENT
Start: 2024-09-13 | End: 2024-09-13 | Stop reason: HOSPADM

## 2024-09-13 RX ORDER — ACETAMINOPHEN 325 MG/1
650 TABLET ORAL ONCE
Status: COMPLETED | OUTPATIENT
Start: 2024-09-13 | End: 2024-09-13

## 2024-09-13 RX ORDER — LORAZEPAM 2 MG/ML
0.5 INJECTION INTRAMUSCULAR
Status: DISCONTINUED | OUTPATIENT
Start: 2024-09-13 | End: 2024-09-13 | Stop reason: HOSPADM

## 2024-09-13 RX ORDER — SODIUM CHLORIDE 0.9 % (FLUSH) 0.9 %
5-40 SYRINGE (ML) INJECTION PRN
Status: DISCONTINUED | OUTPATIENT
Start: 2024-09-13 | End: 2024-09-13 | Stop reason: HOSPADM

## 2024-09-13 RX ORDER — OXYCODONE HYDROCHLORIDE 5 MG/1
5 TABLET ORAL PRN
Status: DISCONTINUED | OUTPATIENT
Start: 2024-09-13 | End: 2024-09-13 | Stop reason: HOSPADM

## 2024-09-13 RX ORDER — BUPIVACAINE HYDROCHLORIDE 5 MG/ML
INJECTION, SOLUTION EPIDURAL; INTRACAUDAL PRN
Status: DISCONTINUED | OUTPATIENT
Start: 2024-09-13 | End: 2024-09-13 | Stop reason: ALTCHOICE

## 2024-09-13 RX ORDER — METOCLOPRAMIDE HYDROCHLORIDE 5 MG/ML
10 INJECTION INTRAMUSCULAR; INTRAVENOUS
Status: COMPLETED | OUTPATIENT
Start: 2024-09-13 | End: 2024-09-13

## 2024-09-13 RX ORDER — ATROPINE SULFATE 0.1 MG/ML
INJECTION INTRAVENOUS
Status: DISCONTINUED
Start: 2024-09-13 | End: 2024-09-13 | Stop reason: WASHOUT

## 2024-09-13 RX ORDER — GLYCOPYRROLATE 0.2 MG/ML
INJECTION INTRAMUSCULAR; INTRAVENOUS
Status: DISCONTINUED | OUTPATIENT
Start: 2024-09-13 | End: 2024-09-13 | Stop reason: SDUPTHER

## 2024-09-13 RX ORDER — MEPERIDINE HYDROCHLORIDE 25 MG/ML
12.5 INJECTION INTRAMUSCULAR; INTRAVENOUS; SUBCUTANEOUS EVERY 5 MIN PRN
Status: DISCONTINUED | OUTPATIENT
Start: 2024-09-13 | End: 2024-09-13 | Stop reason: HOSPADM

## 2024-09-13 RX ORDER — LIDOCAINE HYDROCHLORIDE 10 MG/ML
1 INJECTION, SOLUTION EPIDURAL; INFILTRATION; INTRACAUDAL; PERINEURAL
Status: DISCONTINUED | OUTPATIENT
Start: 2024-09-13 | End: 2024-09-13 | Stop reason: HOSPADM

## 2024-09-13 RX ORDER — SODIUM CHLORIDE 9 MG/ML
INJECTION, SOLUTION INTRAVENOUS PRN
Status: DISCONTINUED | OUTPATIENT
Start: 2024-09-13 | End: 2024-09-13 | Stop reason: HOSPADM

## 2024-09-13 RX ORDER — ONDANSETRON 2 MG/ML
4 INJECTION INTRAMUSCULAR; INTRAVENOUS
Status: COMPLETED | OUTPATIENT
Start: 2024-09-13 | End: 2024-09-13

## 2024-09-13 RX ORDER — PROCHLORPERAZINE EDISYLATE 5 MG/ML
10 INJECTION INTRAMUSCULAR; INTRAVENOUS ONCE
Status: COMPLETED | OUTPATIENT
Start: 2024-09-13 | End: 2024-09-13

## 2024-09-13 RX ORDER — ONDANSETRON 2 MG/ML
INJECTION INTRAMUSCULAR; INTRAVENOUS
Status: DISCONTINUED | OUTPATIENT
Start: 2024-09-13 | End: 2024-09-13 | Stop reason: SDUPTHER

## 2024-09-13 RX ORDER — HYDROCODONE BITARTRATE AND ACETAMINOPHEN 5; 325 MG/1; MG/1
1 TABLET ORAL EVERY 6 HOURS PRN
Qty: 5 TABLET | Refills: 0 | Status: SHIPPED | OUTPATIENT
Start: 2024-09-13 | End: 2024-09-16

## 2024-09-13 RX ADMIN — DEXAMETHASONE SODIUM PHOSPHATE 8 MG: 4 INJECTION INTRA-ARTICULAR; INTRALESIONAL; INTRAMUSCULAR; INTRAVENOUS; SOFT TISSUE at 07:53

## 2024-09-13 RX ADMIN — PROPOFOL 100 MG: 10 INJECTION, EMULSION INTRAVENOUS at 07:41

## 2024-09-13 RX ADMIN — SODIUM CHLORIDE 2000 MG: 900 INJECTION INTRAVENOUS at 07:49

## 2024-09-13 RX ADMIN — ALBUTEROL SULFATE 2.5 MG: 2.5 SOLUTION RESPIRATORY (INHALATION) at 08:56

## 2024-09-13 RX ADMIN — PROPOFOL 30 MG: 10 INJECTION, EMULSION INTRAVENOUS at 07:52

## 2024-09-13 RX ADMIN — SUGAMMADEX 300 MG: 100 INJECTION, SOLUTION INTRAVENOUS at 08:26

## 2024-09-13 RX ADMIN — ONDANSETRON 4 MG: 2 INJECTION INTRAMUSCULAR; INTRAVENOUS at 08:43

## 2024-09-13 RX ADMIN — GLYCOPYRROLATE 0.2 MG: 0.2 INJECTION INTRAMUSCULAR; INTRAVENOUS at 09:15

## 2024-09-13 RX ADMIN — Medication 140 MG: at 07:41

## 2024-09-13 RX ADMIN — GLYCOPYRROLATE 0.1 MG: 0.2 INJECTION INTRAMUSCULAR; INTRAVENOUS at 07:37

## 2024-09-13 RX ADMIN — HYDROMORPHONE HYDROCHLORIDE 0.5 MG: 2 INJECTION, SOLUTION INTRAMUSCULAR; INTRAVENOUS; SUBCUTANEOUS at 07:30

## 2024-09-13 RX ADMIN — HYDROMORPHONE HYDROCHLORIDE 0.5 MG: 1 INJECTION, SOLUTION INTRAMUSCULAR; INTRAVENOUS; SUBCUTANEOUS at 08:49

## 2024-09-13 RX ADMIN — PROCHLORPERAZINE EDISYLATE 10 MG: 5 INJECTION INTRAMUSCULAR; INTRAVENOUS at 10:09

## 2024-09-13 RX ADMIN — ONDANSETRON 4 MG: 2 INJECTION INTRAMUSCULAR; INTRAVENOUS at 12:50

## 2024-09-13 RX ADMIN — ROCURONIUM BROMIDE 30 MG: 10 INJECTION, SOLUTION INTRAVENOUS at 07:51

## 2024-09-13 RX ADMIN — HYDROMORPHONE HYDROCHLORIDE 0.5 MG: 2 INJECTION, SOLUTION INTRAMUSCULAR; INTRAVENOUS; SUBCUTANEOUS at 07:49

## 2024-09-13 RX ADMIN — METOCLOPRAMIDE HYDROCHLORIDE 10 MG: 5 INJECTION INTRAMUSCULAR; INTRAVENOUS at 09:30

## 2024-09-13 RX ADMIN — ACETAMINOPHEN 650 MG: 325 TABLET ORAL at 07:06

## 2024-09-13 RX ADMIN — ONDANSETRON 4 MG: 2 INJECTION INTRAMUSCULAR; INTRAVENOUS at 07:30

## 2024-09-13 RX ADMIN — PROPOFOL 20 MG: 10 INJECTION, EMULSION INTRAVENOUS at 07:49

## 2024-09-13 RX ADMIN — LIDOCAINE HYDROCHLORIDE 80 MG: 20 INJECTION, SOLUTION INTRAVENOUS at 07:41

## 2024-09-13 RX ADMIN — SODIUM CHLORIDE, POTASSIUM CHLORIDE, SODIUM LACTATE AND CALCIUM CHLORIDE: 600; 310; 30; 20 INJECTION, SOLUTION INTRAVENOUS at 07:06

## 2024-09-13 ASSESSMENT — PAIN DESCRIPTION - LOCATION: LOCATION: THROAT

## 2024-09-13 ASSESSMENT — PAIN SCALES - GENERAL
PAINLEVEL_OUTOF10: 1
PAINLEVEL_OUTOF10: 7
PAINLEVEL_OUTOF10: 0
PAINLEVEL_OUTOF10: 0

## 2024-09-13 ASSESSMENT — PAIN DESCRIPTION - DESCRIPTORS
DESCRIPTORS: ACHING
DESCRIPTORS: ACHING

## 2024-09-13 ASSESSMENT — PAIN - FUNCTIONAL ASSESSMENT: PAIN_FUNCTIONAL_ASSESSMENT: 0-10

## 2024-09-17 ENCOUNTER — HOSPITAL ENCOUNTER (EMERGENCY)
Age: 79
Discharge: HOME | End: 2024-09-17
Attending: STUDENT IN AN ORGANIZED HEALTH CARE EDUCATION/TRAINING PROGRAM
Payer: MEDICARE

## 2024-09-17 VITALS
BODY MASS INDEX: 42.49 KG/M2 | OXYGEN SATURATION: 100 % | HEART RATE: 45 BPM | SYSTOLIC BLOOD PRESSURE: 135 MMHG | DIASTOLIC BLOOD PRESSURE: 60 MMHG | TEMPERATURE: 98.3 F | HEIGHT: 67 IN | RESPIRATION RATE: 15 BRPM | WEIGHT: 270.7 LBS

## 2024-09-17 DIAGNOSIS — K64.9 HEMORRHOIDS, UNSPECIFIED HEMORRHOID TYPE: Primary | ICD-10-CM

## 2024-09-17 LAB
ANION GAP SERPL CALCULATED.3IONS-SCNC: 11 MMOL/L (ref 3–16)
BASOPHILS # BLD: 0 K/UL (ref 0–0.2)
BASOPHILS NFR BLD: 0 %
BUN SERPL-MCNC: 11 MG/DL (ref 7–20)
CALCIUM SERPL-MCNC: 9.7 MG/DL (ref 8.3–10.6)
CHLORIDE SERPL-SCNC: 105 MMOL/L (ref 99–110)
CO2 SERPL-SCNC: 28 MMOL/L (ref 21–32)
CREAT SERPL-MCNC: 0.8 MG/DL (ref 0.6–1.2)
DEPRECATED RDW RBC AUTO: 15 % (ref 12.4–15.4)
EOSINOPHIL # BLD: 0.2 K/UL (ref 0–0.6)
EOSINOPHIL NFR BLD: 3 %
GFR SERPLBLD CREATININE-BSD FMLA CKD-EPI: 75 ML/MIN/{1.73_M2}
GLUCOSE SERPL-MCNC: 119 MG/DL (ref 70–99)
HCT VFR BLD AUTO: 39.9 % (ref 36–48)
HGB BLD-MCNC: 12.9 G/DL (ref 12–16)
LYMPHOCYTES # BLD: 3.6 K/UL (ref 1–5.1)
LYMPHOCYTES NFR BLD: 62 %
MCH RBC QN AUTO: 29 PG (ref 26–34)
MCHC RBC AUTO-ENTMCNC: 32.3 G/DL (ref 31–36)
MCV RBC AUTO: 89.9 FL (ref 80–100)
MONOCYTES # BLD: 0.6 K/UL (ref 0–1.3)
MONOCYTES NFR BLD: 11 %
NEUTROPHILS # BLD: 1.4 K/UL (ref 1.7–7.7)
NEUTROPHILS NFR BLD: 24 %
PATH INTERP BLD-IMP: NO
PLATELET # BLD AUTO: 259 K/UL (ref 135–450)
PLATELET BLD QL SMEAR: ADEQUATE
PMV BLD AUTO: 8.3 FL (ref 5–10.5)
POTASSIUM SERPL-SCNC: 4.3 MMOL/L (ref 3.5–5.1)
RBC # BLD AUTO: 4.44 M/UL (ref 4–5.2)
RBC MORPH BLD: NORMAL
SLIDE REVIEW: ABNORMAL
SODIUM SERPL-SCNC: 144 MMOL/L (ref 136–145)
WBC # BLD AUTO: 5.8 K/UL (ref 4–11)

## 2024-09-17 PROCEDURE — 99284 EMERGENCY DEPT VISIT MOD MDM: CPT

## 2024-09-17 PROCEDURE — 80048 BASIC METABOLIC PNL TOTAL CA: CPT

## 2024-09-17 PROCEDURE — 85025 COMPLETE CBC W/AUTO DIFF WBC: CPT

## 2024-09-17 ASSESSMENT — LIFESTYLE VARIABLES
HOW OFTEN DO YOU HAVE A DRINK CONTAINING ALCOHOL: NEVER
HOW MANY STANDARD DRINKS CONTAINING ALCOHOL DO YOU HAVE ON A TYPICAL DAY: PATIENT DOES NOT DRINK

## 2024-09-17 ASSESSMENT — PAIN - FUNCTIONAL ASSESSMENT: PAIN_FUNCTIONAL_ASSESSMENT: NONE - DENIES PAIN

## 2024-10-01 ENCOUNTER — OFFICE VISIT (OUTPATIENT)
Dept: SURGERY | Age: 79
End: 2024-10-01

## 2024-10-01 VITALS
HEIGHT: 67 IN | HEART RATE: 50 BPM | BODY MASS INDEX: 41.21 KG/M2 | RESPIRATION RATE: 18 BRPM | WEIGHT: 262.6 LBS | OXYGEN SATURATION: 99 %

## 2024-10-01 DIAGNOSIS — Z17.0 MALIGNANT NEOPLASM OF UPPER-INNER QUADRANT OF LEFT BREAST IN FEMALE, ESTROGEN RECEPTOR POSITIVE (HCC): Primary | ICD-10-CM

## 2024-10-01 DIAGNOSIS — C50.212 MALIGNANT NEOPLASM OF UPPER-INNER QUADRANT OF LEFT BREAST IN FEMALE, ESTROGEN RECEPTOR POSITIVE (HCC): Primary | ICD-10-CM

## 2024-10-01 DIAGNOSIS — Z90.12 S/P PARTIAL MASTECTOMY, LEFT: ICD-10-CM

## 2024-10-01 PROCEDURE — 99024 POSTOP FOLLOW-UP VISIT: CPT | Performed by: SURGERY

## 2024-10-01 NOTE — PROGRESS NOTES
10/1/2024    Chief Complaint   Patient presents with    Post-Op Check     Post op-        HPI Patient is s/p left breast lumpectomy 9/13/2024 for grade 2 T1cNx invasive ductal carcinoma, ER/IL positive, Her2 negative. She saw oncology, Dr. Jaime, placed on Letrozole, tolerating well. Oncotype is pending. To start radiation soon. Follow up with me in 5 months with left diagnostic mammogram.       Current Outpatient Medications:     ondansetron (ZOFRAN) 4 MG tablet, Take 1 tablet by mouth 3 times daily as needed for Nausea or Vomiting, Disp: 8 tablet, Rfl: 0    phytonadione (VITAMIN K) 5 MG tablet, Take 1 tablet by mouth once, Disp: , Rfl:     ipratropium (ATROVENT HFA) 17 MCG/ACT inhaler, Inhale 1 puff into the lungs 3 times daily, Disp: 12.9 g, Rfl: 3    meloxicam (MOBIC) 7.5 MG tablet, Take 1 tablet by mouth daily as needed for Pain, Disp: 30 tablet, Rfl: 5    montelukast (SINGULAIR) 10 MG tablet, TAKE 1 TABLET DAILY, Disp: 30 tablet, Rfl: 0    famotidine (PEPCID) 20 MG tablet, TAKE 1 TABLET NIGHTLY, Disp: 30 tablet, Rfl: 0    losartan (COZAAR) 100 MG tablet, TAKE 1 TABLET DAILY, Disp: 90 tablet, Rfl: 0    atorvastatin (LIPITOR) 20 MG tablet, TAKE 1 TABLET DAILY, Disp: 90 tablet, Rfl: 3    triamterene-hydroCHLOROthiazide (MAXZIDE-25) 37.5-25 MG per tablet, TAKE 1 TABLET DAILY, Disp: 90 tablet, Rfl: 3    amLODIPine (NORVASC) 10 MG tablet, TAKE 1 TABLET DAILY (Patient taking differently: at bedtime TAKE 1 TABLET DAILY), Disp: 90 tablet, Rfl: 3    mometasone (NASONEX) 50 MCG/ACT nasal spray, 2 sprays by Nasal route daily, Disp: 3 each, Rfl: 1    albuterol sulfate  (90 Base) MCG/ACT inhaler, USE 2 INHALATIONS EVERY 6 HOURS AS NEEDED FOR WHEEZING, Disp: 1 each, Rfl: 2    fluocinolone (DERMOTIC) 0.01 % OIL oil, Place 3 drops in ear(s) 2 times daily as needed (ear itching), Disp: 1 each, Rfl: 3    Cholecalciferol (VITAMIN D) 2000 UNITS CAPS capsule, Take by mouth, Disp: , Rfl:     dorzolamide (TRUSOPT) 2 %

## 2024-10-01 NOTE — PATIENT INSTRUCTIONS
Begin daily gentle massage with mild unscented lotion to disburse scar tissue    Healthy Lifestyle Recommendations: healthy diet (decrease consumption of red meat, increase fresh fruits and vegetables), decreased alcohol consumption (less than 4 drinks/week), adequate sleep (goal 6-8 hours), routine exercise (goal 150 minutes/week or greater), weight control.     Continue self breast exams    Return: 6 months with left diagnostic mammogram and office visit with Dr. Oneil

## 2024-10-03 ENCOUNTER — TELEPHONE (OUTPATIENT)
Dept: CARDIOLOGY CLINIC | Age: 79
End: 2024-10-03

## 2024-10-03 NOTE — TELEPHONE ENCOUNTER
Today is a 10/3/2024.  I did receive a message from Dr. Jasper Narvaez about this patient undergoing radiation therapy at 5 sessions oncologic treatments for her breast cancer but was concerned about her anticipation of a cardiac procedure.  She did have a abnormal stress test and we recommended cardiac cath which she elected to delay.  I think she is stable enough that we can proceed with the radiation treatments and do the cath at some future date.  If she changes her position or her concerns then we can get her scheduled very soon.  Otherwise I do think that proceeding with oncological treatment would be appropriate and she should do well.    Francisco Javier Roque MD, FACC    Please CC this note to Dr. Jasper Narvaez

## 2024-10-10 DIAGNOSIS — I10 ESSENTIAL HYPERTENSION, BENIGN: ICD-10-CM

## 2024-10-10 RX ORDER — LOSARTAN POTASSIUM 100 MG/1
TABLET ORAL
Qty: 90 TABLET | Refills: 3 | Status: SHIPPED | OUTPATIENT
Start: 2024-10-10

## 2024-10-16 ENCOUNTER — OFFICE VISIT (OUTPATIENT)
Dept: INTERNAL MEDICINE CLINIC | Age: 79
End: 2024-10-16

## 2024-10-16 VITALS
SYSTOLIC BLOOD PRESSURE: 118 MMHG | BODY MASS INDEX: 41.69 KG/M2 | TEMPERATURE: 96.8 F | DIASTOLIC BLOOD PRESSURE: 68 MMHG | OXYGEN SATURATION: 99 % | HEART RATE: 54 BPM | WEIGHT: 265.6 LBS | HEIGHT: 67 IN

## 2024-10-16 DIAGNOSIS — I10 ESSENTIAL HYPERTENSION, BENIGN: ICD-10-CM

## 2024-10-16 DIAGNOSIS — I87.2 VENOUS INSUFFICIENCY OF BOTH LOWER EXTREMITIES: ICD-10-CM

## 2024-10-16 DIAGNOSIS — C50.212 MALIGNANT NEOPLASM OF UPPER-INNER QUADRANT OF LEFT BREAST IN FEMALE, ESTROGEN RECEPTOR POSITIVE (HCC): ICD-10-CM

## 2024-10-16 DIAGNOSIS — Z17.0 MALIGNANT NEOPLASM OF UPPER-INNER QUADRANT OF LEFT BREAST IN FEMALE, ESTROGEN RECEPTOR POSITIVE (HCC): ICD-10-CM

## 2024-10-16 DIAGNOSIS — J45.20 MILD INTERMITTENT EXTRINSIC ASTHMA WITHOUT COMPLICATION: ICD-10-CM

## 2024-10-16 DIAGNOSIS — E66.01 MORBID OBESITY: Primary | Chronic | ICD-10-CM

## 2024-10-16 SDOH — ECONOMIC STABILITY: INCOME INSECURITY: HOW HARD IS IT FOR YOU TO PAY FOR THE VERY BASICS LIKE FOOD, HOUSING, MEDICAL CARE, AND HEATING?: SOMEWHAT HARD

## 2024-10-16 ASSESSMENT — PATIENT HEALTH QUESTIONNAIRE - PHQ9
SUM OF ALL RESPONSES TO PHQ QUESTIONS 1-9: 4
2. FEELING DOWN, DEPRESSED OR HOPELESS: NOT AT ALL
9. THOUGHTS THAT YOU WOULD BE BETTER OFF DEAD, OR OF HURTING YOURSELF: NOT AT ALL
6. FEELING BAD ABOUT YOURSELF - OR THAT YOU ARE A FAILURE OR HAVE LET YOURSELF OR YOUR FAMILY DOWN: NOT AT ALL
3. TROUBLE FALLING OR STAYING ASLEEP: SEVERAL DAYS
SUM OF ALL RESPONSES TO PHQ QUESTIONS 1-9: 4
4. FEELING TIRED OR HAVING LITTLE ENERGY: SEVERAL DAYS
5. POOR APPETITE OR OVEREATING: SEVERAL DAYS
7. TROUBLE CONCENTRATING ON THINGS, SUCH AS READING THE NEWSPAPER OR WATCHING TELEVISION: NOT AT ALL
SUM OF ALL RESPONSES TO PHQ9 QUESTIONS 1 & 2: 0
SUM OF ALL RESPONSES TO PHQ QUESTIONS 1-9: 4
SUM OF ALL RESPONSES TO PHQ QUESTIONS 1-9: 4
8. MOVING OR SPEAKING SO SLOWLY THAT OTHER PEOPLE COULD HAVE NOTICED. OR THE OPPOSITE, BEING SO FIGETY OR RESTLESS THAT YOU HAVE BEEN MOVING AROUND A LOT MORE THAN USUAL: SEVERAL DAYS
1. LITTLE INTEREST OR PLEASURE IN DOING THINGS: NOT AT ALL

## 2024-10-16 ASSESSMENT — ANXIETY QUESTIONNAIRES
3. WORRYING TOO MUCH ABOUT DIFFERENT THINGS: SEVERAL DAYS
2. NOT BEING ABLE TO STOP OR CONTROL WORRYING: NOT AT ALL
1. FEELING NERVOUS, ANXIOUS, OR ON EDGE: NOT AT ALL

## 2024-10-16 NOTE — PROGRESS NOTES
Chief Complaint   Patient presents with    Follow-up     Bradycardia  Surgery on left breast on 9/13 - doing well, no concerns       Assessment/Plan:  Juliana was seen today for follow-up.    Diagnoses and all orders for this visit:    Morbid obesity    Malignant neoplasm of upper-inner quadrant of left breast in female, estrogen receptor positive (HCC)  Comments:  She is following with Dr. Méndez.  Stage I lesion.  Radiation and hormone therapy.    Essential hypertension, benign  Comments:  Chronic, stable.    Mild intermittent extrinsic asthma without complication  Comments:  Chronic, stable.    Venous insufficiency of both lower extremities  -     Elastic Bandages & Supports (MEDICAL COMPRESSION STOCKINGS) MISC; Wear 30-40 mmHg pressure during the day.        Vitals:    10/16/24 0901   BP: 118/68   Pulse: 54   Temp: 96.8 °F (36 °C)   SpO2: 99%       Physical Exam  Vitals reviewed.   Constitutional:       General: She is not in acute distress.     Appearance: She is well-developed. She is not diaphoretic.   HENT:      Head: Normocephalic and atraumatic.   Pulmonary:      Effort: Pulmonary effort is normal.   Neurological:      Mental Status: She is alert and oriented to person, place, and time.      Cranial Nerves: No cranial nerve deficit.   Psychiatric:         Behavior: Behavior normal.         Thought Content: Thought content normal.         Judgment: Judgment normal.             10/16/2024     9:08 AM 8/19/2024     3:40 PM 6/28/2023    10:19 AM 3/6/2023    10:29 AM 9/2/2022     9:51 AM 9/1/2021     9:53 AM 3/1/2021    10:46 AM   PHQ Scores   PHQ2 Score 0 0 0 0 0 0 0   PHQ9 Score 4 2 0 0 0 0 0     Interpretation of Total Score Depression Severity: 1-4 = Minimal depression, 5-9 = Mild depression, 10-14 = Moderate depression, 15-19 = Moderately severe depression, 20-27 = Severe depression           Luis Felipe Hilton MD  FACP

## 2024-10-17 ENCOUNTER — TELEPHONE (OUTPATIENT)
Dept: CARDIOLOGY CLINIC | Age: 79
End: 2024-10-17

## 2024-10-17 NOTE — TELEPHONE ENCOUNTER
The patient called stating she was returning a call from Judy LISA and she will give her a call back on Monday. [phone]

## 2024-10-21 ENCOUNTER — TELEPHONE (OUTPATIENT)
Dept: CARDIOLOGY CLINIC | Age: 79
End: 2024-10-21

## 2024-10-21 NOTE — TELEPHONE ENCOUNTER
Left Heart Catheterization    A left heart catheterization is a procedure that provides your cardiologist with detailed information regarding how your heart functions.  A small catheter (long, fine tube) is inserted into an artery (a vessel that carries blood and oxygen) that leads to your heart.  While watching with x-ray equipment, small amounts of dye are injected which enables visualization of the heart arteries and chambers.  The pictures that your cardiologist receives from the cardiac catheterization enable him or her to decide on the best treatment for you.      Date of the procedure:       Time of arrival:      Cardiologist performing the procedure:      Instructions for your left heart catheterization:    1.  Bring a list of your medications to the hospital.    2.  Please notify us before the procedure if you are allergic to anything; especially x-ray contrast dye, iodine, nickel, or any type of jewelry.  This is very important!    3.  Do not eat or drink anything at all after midnight (or 8 hours) prior to the procedure.    4.  Take all morning medications EXCEPT any diuretics (water pills) the day of the procedure with a small sip of water.    5.  If you are on Coumadin, Warfarin, or Jantoven, please notify us so that we can make adjustments to your medication.    6.  If you are taking Xarelto, Eliquis, or Pradaxa, please stop staking these medications two days prior to the procedure (including the day of the procedure).    7.  If you are diabetic, check your blood sugar in the morning.  If your blood sugar is 120 or less, do not take insulin.  If your blood sugar is more than 120, take half the dose of your normal insulin.  Do not take Metformin the night before your procedure or morning of the procedure.    8.  You MUST have someone to drive you home--no driving for 24 hours after your procedure.  If an intervention is performed, you might stay overnight in the hospital.    9.  Discharge

## 2024-10-23 LAB
Lab: NORMAL
REPORT: NORMAL
THIS TEST SENT TO: NORMAL

## 2024-10-25 NOTE — TELEPHONE ENCOUNTER
Procedure:  [facility]  Doctor:  Dr. Roque  Date:  11/25/24  Time:  9am  Arrival:  7:30am  Reps:  n/a  Anesthesia:  n/a      Spoke with patient. Instructions emailed and sent thru MyChart.

## 2024-10-28 DIAGNOSIS — Z01.818 PRE-OP TESTING: Primary | ICD-10-CM

## 2024-11-18 ENCOUNTER — TELEPHONE (OUTPATIENT)
Dept: CARDIOLOGY CLINIC | Age: 79
End: 2024-11-18

## 2024-11-18 NOTE — TELEPHONE ENCOUNTER
Called and spoke to patient and answered all of her question's in regards to her upcoming angiogram with  on November 25,2024.

## 2024-11-18 NOTE — TELEPHONE ENCOUNTER
Pt believes she missed a call about her procedure. Pt was not sure if it was the office or the . Pt would like a return call.    [phone]

## 2024-11-22 NOTE — PRE-PROCEDURE INSTRUCTIONS
Called patient about procedure.Told to be here at 0730 for procedure at 0900. NPO after midnight, but can take morning medication with sips of water, patient stated they are not on blood thinners. To have a responsible adult be with patient take them home and stay with them afterwards, if they do not get admitted to hosptial. And if available bring current list of medications. No other questions or concerns.

## 2024-11-25 ENCOUNTER — HOSPITAL ENCOUNTER (OUTPATIENT)
Age: 79
Setting detail: OUTPATIENT SURGERY
Discharge: HOME | End: 2024-11-25
Attending: INTERNAL MEDICINE | Admitting: INTERNAL MEDICINE
Payer: MEDICARE

## 2024-11-25 VITALS
SYSTOLIC BLOOD PRESSURE: 136 MMHG | WEIGHT: 260 LBS | HEIGHT: 67 IN | TEMPERATURE: 98.1 F | DIASTOLIC BLOOD PRESSURE: 68 MMHG | BODY MASS INDEX: 40.81 KG/M2 | OXYGEN SATURATION: 99 % | RESPIRATION RATE: 12 BRPM | HEART RATE: 46 BPM

## 2024-11-25 DIAGNOSIS — R94.39 ABNORMAL CARDIOVASCULAR STRESS TEST: ICD-10-CM

## 2024-11-25 LAB
ANION GAP SERPL CALCULATED.3IONS-SCNC: 13 MMOL/L (ref 3–16)
BUN SERPL-MCNC: 27 MG/DL (ref 7–20)
CALCIUM SERPL-MCNC: 10.5 MG/DL (ref 8.3–10.6)
CHLORIDE SERPL-SCNC: 101 MMOL/L (ref 99–110)
CO2 SERPL-SCNC: 24 MMOL/L (ref 21–32)
CREAT SERPL-MCNC: 1.1 MG/DL (ref 0.6–1.2)
DEPRECATED RDW RBC AUTO: 15.4 % (ref 12.4–15.4)
ECHO BSA: 2.36 M2
EKG ATRIAL RATE: 50 BPM
EKG DIAGNOSIS: NORMAL
EKG P AXIS: 45 DEGREES
EKG P-R INTERVAL: 194 MS
EKG Q-T INTERVAL: 436 MS
EKG QRS DURATION: 108 MS
EKG QTC CALCULATION (BAZETT): 397 MS
EKG R AXIS: 41 DEGREES
EKG T AXIS: 22 DEGREES
EKG VENTRICULAR RATE: 50 BPM
GFR SERPLBLD CREATININE-BSD FMLA CKD-EPI: 51 ML/MIN/{1.73_M2}
GLUCOSE SERPL-MCNC: 112 MG/DL (ref 70–99)
HCT VFR BLD AUTO: 38.7 % (ref 36–48)
HGB BLD-MCNC: 12.8 G/DL (ref 12–16)
INR PPP: 1.02 (ref 0.85–1.15)
MCH RBC QN AUTO: 29.3 PG (ref 26–34)
MCHC RBC AUTO-ENTMCNC: 32.9 G/DL (ref 31–36)
MCV RBC AUTO: 89 FL (ref 80–100)
PLATELET # BLD AUTO: 242 K/UL (ref 135–450)
PMV BLD AUTO: 7.5 FL (ref 5–10.5)
POTASSIUM SERPL-SCNC: 3.7 MMOL/L (ref 3.5–5.1)
PROTHROMBIN TIME: 13.6 SEC (ref 11.9–14.9)
RBC # BLD AUTO: 4.35 M/UL (ref 4–5.2)
SODIUM SERPL-SCNC: 138 MMOL/L (ref 136–145)
WBC # BLD AUTO: 4.7 K/UL (ref 4–11)

## 2024-11-25 PROCEDURE — 6360000002 HC RX W HCPCS: Performed by: INTERNAL MEDICINE

## 2024-11-25 PROCEDURE — 99152 MOD SED SAME PHYS/QHP 5/>YRS: CPT | Performed by: INTERNAL MEDICINE

## 2024-11-25 PROCEDURE — 93458 L HRT ARTERY/VENTRICLE ANGIO: CPT | Performed by: INTERNAL MEDICINE

## 2024-11-25 PROCEDURE — 93010 ELECTROCARDIOGRAM REPORT: CPT | Performed by: INTERNAL MEDICINE

## 2024-11-25 PROCEDURE — 85610 PROTHROMBIN TIME: CPT

## 2024-11-25 PROCEDURE — 2500000003 HC RX 250 WO HCPCS: Performed by: INTERNAL MEDICINE

## 2024-11-25 PROCEDURE — 80061 LIPID PANEL: CPT

## 2024-11-25 PROCEDURE — 85027 COMPLETE CBC AUTOMATED: CPT

## 2024-11-25 PROCEDURE — C1894 INTRO/SHEATH, NON-LASER: HCPCS | Performed by: INTERNAL MEDICINE

## 2024-11-25 PROCEDURE — C1769 GUIDE WIRE: HCPCS | Performed by: INTERNAL MEDICINE

## 2024-11-25 PROCEDURE — 99213 OFFICE O/P EST LOW 20 MIN: CPT | Performed by: INTERNAL MEDICINE

## 2024-11-25 PROCEDURE — 6360000004 HC RX CONTRAST MEDICATION: Performed by: INTERNAL MEDICINE

## 2024-11-25 PROCEDURE — 7100000011 HC PHASE II RECOVERY - ADDTL 15 MIN: Performed by: INTERNAL MEDICINE

## 2024-11-25 PROCEDURE — 93005 ELECTROCARDIOGRAM TRACING: CPT | Performed by: INTERNAL MEDICINE

## 2024-11-25 PROCEDURE — 99213 OFFICE O/P EST LOW 20 MIN: CPT | Performed by: NURSE PRACTITIONER

## 2024-11-25 PROCEDURE — 80048 BASIC METABOLIC PNL TOTAL CA: CPT

## 2024-11-25 PROCEDURE — 6370000000 HC RX 637 (ALT 250 FOR IP): Performed by: INTERNAL MEDICINE

## 2024-11-25 PROCEDURE — 7100000010 HC PHASE II RECOVERY - FIRST 15 MIN: Performed by: INTERNAL MEDICINE

## 2024-11-25 PROCEDURE — 2709999900 HC NON-CHARGEABLE SUPPLY: Performed by: INTERNAL MEDICINE

## 2024-11-25 RX ORDER — ACETAMINOPHEN 325 MG/1
650 TABLET ORAL EVERY 4 HOURS PRN
Status: DISCONTINUED | OUTPATIENT
Start: 2024-11-25 | End: 2024-11-25 | Stop reason: HOSPADM

## 2024-11-25 RX ORDER — LIDOCAINE HYDROCHLORIDE 10 MG/ML
INJECTION, SOLUTION EPIDURAL; INFILTRATION; INTRACAUDAL; PERINEURAL PRN
Status: DISCONTINUED | OUTPATIENT
Start: 2024-11-25 | End: 2024-11-25 | Stop reason: HOSPADM

## 2024-11-25 RX ORDER — ASPIRIN 325 MG
325 TABLET ORAL ONCE
Status: COMPLETED | OUTPATIENT
Start: 2024-11-25 | End: 2024-11-25

## 2024-11-25 RX ORDER — SODIUM CHLORIDE 9 MG/ML
INJECTION, SOLUTION INTRAVENOUS PRN
Status: DISCONTINUED | OUTPATIENT
Start: 2024-11-25 | End: 2024-11-25 | Stop reason: HOSPADM

## 2024-11-25 RX ORDER — SODIUM CHLORIDE 9 MG/ML
INJECTION, SOLUTION INTRAVENOUS CONTINUOUS
Status: DISCONTINUED | OUTPATIENT
Start: 2024-11-25 | End: 2024-11-25 | Stop reason: HOSPADM

## 2024-11-25 RX ORDER — SODIUM CHLORIDE 0.9 % (FLUSH) 0.9 %
5-40 SYRINGE (ML) INJECTION PRN
Status: DISCONTINUED | OUTPATIENT
Start: 2024-11-25 | End: 2024-11-25 | Stop reason: HOSPADM

## 2024-11-25 RX ORDER — SODIUM CHLORIDE 0.9 % (FLUSH) 0.9 %
5-40 SYRINGE (ML) INJECTION EVERY 12 HOURS SCHEDULED
Status: DISCONTINUED | OUTPATIENT
Start: 2024-11-25 | End: 2024-11-25 | Stop reason: HOSPADM

## 2024-11-25 RX ORDER — MIDAZOLAM HYDROCHLORIDE 1 MG/ML
INJECTION, SOLUTION INTRAMUSCULAR; INTRAVENOUS PRN
Status: DISCONTINUED | OUTPATIENT
Start: 2024-11-25 | End: 2024-11-25 | Stop reason: HOSPADM

## 2024-11-25 RX ORDER — IOPAMIDOL 755 MG/ML
INJECTION, SOLUTION INTRAVASCULAR PRN
Status: DISCONTINUED | OUTPATIENT
Start: 2024-11-25 | End: 2024-11-25 | Stop reason: HOSPADM

## 2024-11-25 RX ADMIN — ASPIRIN 325 MG: 325 TABLET ORAL at 08:29

## 2024-11-25 NOTE — DISCHARGE INSTRUCTIONS
machinery, or power tools), make any important decisions or sign any important/legal documents, or drink alcoholic beverages for the next 24 hours  __x__ We strongly suggest that a responsible adult be with you for the next 24 hours for your protection and safety  __x__ If the intravenous catheter site is painful, apply warm wet compresses on the site until the soreness is relieved and elevate the arm above the heart.  Call your physician if no improvement in 2 to 3 days    DIETARY INSTRUCTIONS:    __x__ Drink extra fluids over the next 24 hours (If not contraindicated by illness or by  physician order)  ____ Start with clear liquids and progress to normal diet as you feel like eating.  If you experience nausea or repeated episodes of vomiting, which persist beyond 12-24 hours, notify your doctor        ____ Resume your previous diet      MEDICATION INSTRUCTIONS:    ____ See Medication Reconciliation Sheet          Call: _________________________________     FOLLOW-UP APPOINTMENT    Follow up with MD as directed.    Belongings returned to patient and/or family: Yes.    The Discharge Instructions have been explained to me.  I understand and can verbalize these instructions.

## 2024-11-25 NOTE — ANESTHESIA PRE-OP
[facility]  2024, 7:56 AM    H&P Update    I have reviewed the history and physical and examined the patient and find no relevant changes.   I have reviewed with the patient and/or family the risks, benefits, and alternatives to the procedure.    Pre-sedation Assessment    Patient:  Juliana Reyes   :   1945  Intended Procedure: cath and possible intervention  Procedure indications this patient 78 years old with abnormal stress test suggesting reversible ischemia.  Chest pains are atypical.  Did   have left upper lobe   lumpectomy 2024.  On chemotherapy.  Here for cardiac cath and potential intervention.  Creatinine is 0.8.  LDL cholesterol is 66 on Lipitor 20.  Patient runs of bradycardia sinus most recently at 48/min and this is chronic for her therefore no beta-blockers.    [facility] nurses notes reviewed and agreed.  Medications reviewed  Allergies:   Allergies   Allergen Reactions    Oxycodone Nausea Only    Penicillins Nausea Only       There were no vitals filed for this visit.    Pre-Procedure Assessment/Plan:  ASA 2 - Patient with mild systemic disease with no functional limitations  Mallampati score : Mallampati 1Level of Sedation Plan:Moderate sedation    Post Procedure plan: Return to same level of care  Francisco Javier Roque M.D.,FACC

## 2024-11-25 NOTE — PROCEDURES
PROCEDURE NOTE  Date: 11/25/2024   Name: Juliana Reyes  YOB: 1945    Procedures        Left heart cath  Selective coronary arteriogram  Left ventriculogram  Right radial artery access using ultrasound assist    This patient currently age 78 with recently diagnosed left breast cancer and did have lumpectomy and is on chemotherapy.  Her stress test is suggestive of reversible coronary ischemia.  She is here today for cardiac cath.    Any and all anesthetics were administered by myself or by my staff with me present in the room.  Blood loss is minimal.  There were no complications.    Right coronary artery normal.  No lesions.  Probably codominant.  Left main normal.  LAD normal.  Circumflex probably codominant normal.    Left ventricle normal contractility ejection fraction 65%.  PEEP 6.  There was no LV to aortic gradient on pullback.    Assessment normal coronary arteries with probable cold dominance.  Normal LV function.    Plans continue current medical management.  Continue.  Treatment for her breast cancer as as prescribed by her oncologist.  No further cardiac evaluation or images needed.  Would continue to try to optimize lipid management.    Regarding the bradycardia.  I note that she has been running slow rates in the mid 40s to 60s for several years.  No signs or symptoms or any indication of a pacemaker need.  She is now on any negative inotropic drugs.  Will recommend in the next few weeks we do a monitor for several nights to determine if she is having asymptomatic significant bradycardias.  Again and no negative inotropic medications.        Francisco Javier Roque MD, FACC

## 2024-11-26 PROBLEM — R94.39 ABNORMAL CARDIOVASCULAR STRESS TEST: Status: ACTIVE | Noted: 2024-11-26

## 2024-11-26 NOTE — H&P
mL/min (based on SCr of 1.1 mg/dL).   CBC:   Recent Labs     11/25/24  0810   WBC 4.7   HGB 12.8   HCT 38.7   MCV 89.0        Thyroid:   Lab Results   Component Value Date/Time    TSH 4.22 09/02/2022 10:44 AM     Lipids:   Lab Results   Component Value Date/Time    CHOL 170 08/16/2024 10:58 AM    HDL 83 08/16/2024 10:58 AM    HDL 65 02/16/2012 08:10 AM    TRIG 64 08/16/2024 10:58 AM     LFTS:   Lab Results   Component Value Date/Time    ALT 17 08/16/2024 10:58 AM    AST 18 08/16/2024 10:58 AM    ALKPHOS 91 08/16/2024 10:58 AM    AGRATIO 1.7 08/16/2024 10:58 AM    BILITOT 1.1 08/16/2024 10:58 AM     Cardiac Enzymes:   Lab Results   Component Value Date/Time    TROPONINI <0.01 12/05/2018 06:48 PM    TROPONINI 0.00 01/07/2013 10:45 AM    TROPONINI <0.01 02/15/2012 09:34 PM    TROPONINI <0.01 02/15/2012 04:17 PM    TROPONINI <0.05 02/15/2012 10:55 AM     Coags:   Lab Results   Component Value Date/Time    PROTIME 13.6 11/25/2024 08:10 AM    INR 1.02 11/25/2024 08:10 AM         Problem List:   Patient Active Problem List    Diagnosis Date Noted    Malignant neoplasm of upper-inner quadrant of left breast in female, estrogen receptor positive (HCC) 08/06/2024    Bradycardia 09/01/2021    Acute bronchitis 05/03/2019    Rhinitis 05/03/2019    Morbid obesity 11/30/2017    Mixed hyperlipidemia 05/10/2016    GERD (gastroesophageal reflux disease) 06/29/2011    Essential hypertension, benign 06/13/2011    Extrinsic asthma 06/13/2011      Assessment     Abnormal stress test  8/29/24 Ordering provider, Leanne Anthony CNP and Dr Roque were notified of results.    Stress Combined Conclusion: The study is most consistent with myocardial ischemia. Findings suggest a moderate risk of cardiac events.    Stress Function: Left ventricular function post-stress is normal. Post-stress ejection fraction is 68%. The stress end diastolic cavity size is normal. Stress end diastolic volume: 121 mL. Stress end systolic volume: 39 mL.

## 2024-11-27 LAB
CHOLEST SERPL-MCNC: 181 MG/DL (ref 0–199)
HDLC SERPL-MCNC: 76 MG/DL (ref 40–60)
LDLC SERPL CALC-MCNC: 90 MG/DL
TRIGL SERPL-MCNC: 76 MG/DL (ref 0–150)
VLDLC SERPL CALC-MCNC: 15 MG/DL

## 2025-02-15 DIAGNOSIS — M15.0 PRIMARY OSTEOARTHRITIS INVOLVING MULTIPLE JOINTS: ICD-10-CM

## 2025-02-16 NOTE — TELEPHONE ENCOUNTER
Recent Visits  Date Type Provider Dept   10/16/24 Office Visit Luis Felipe Hilton MD [facility]   08/19/24 Office Visit Luis Felipe Hilton MD [facility]   Showing recent visits within past 540 days with a meds authorizing provider and meeting all other requirements  Future Appointments  Date Type Provider Dept   04/16/25 Appointment Luis Felipe Hilton MD [facility]   Showing future appointments within next 150 days with a meds authorizing provider and meeting all other requirements     10/16/2024

## 2025-02-17 RX ORDER — MELOXICAM 7.5 MG/1
7.5 TABLET ORAL DAILY PRN
Qty: 30 TABLET | Refills: 5 | Status: SHIPPED | OUTPATIENT
Start: 2025-02-17

## 2025-04-01 ENCOUNTER — OFFICE VISIT (OUTPATIENT)
Dept: CARDIOLOGY CLINIC | Age: 80
End: 2025-04-01
Payer: MEDICARE

## 2025-04-01 VITALS
DIASTOLIC BLOOD PRESSURE: 78 MMHG | HEIGHT: 67 IN | WEIGHT: 281 LBS | HEART RATE: 60 BPM | SYSTOLIC BLOOD PRESSURE: 118 MMHG | BODY MASS INDEX: 44.1 KG/M2

## 2025-04-01 DIAGNOSIS — E78.2 MIXED HYPERLIPIDEMIA: Chronic | ICD-10-CM

## 2025-04-01 DIAGNOSIS — R94.39 ABNORMAL CARDIOVASCULAR STRESS TEST: ICD-10-CM

## 2025-04-01 DIAGNOSIS — E66.01 MORBID OBESITY: Primary | Chronic | ICD-10-CM

## 2025-04-01 DIAGNOSIS — I50.9 CHRONIC CONGESTIVE HEART FAILURE, UNSPECIFIED HEART FAILURE TYPE (HCC): ICD-10-CM

## 2025-04-01 DIAGNOSIS — I10 ESSENTIAL HYPERTENSION, BENIGN: ICD-10-CM

## 2025-04-01 DIAGNOSIS — R00.1 BRADYCARDIA: ICD-10-CM

## 2025-04-01 DIAGNOSIS — K21.00 GASTROESOPHAGEAL REFLUX DISEASE WITH ESOPHAGITIS WITHOUT HEMORRHAGE: Chronic | ICD-10-CM

## 2025-04-01 PROCEDURE — 1160F RVW MEDS BY RX/DR IN RCRD: CPT | Performed by: NURSE PRACTITIONER

## 2025-04-01 PROCEDURE — 3074F SYST BP LT 130 MM HG: CPT | Performed by: NURSE PRACTITIONER

## 2025-04-01 PROCEDURE — 1123F ACP DISCUSS/DSCN MKR DOCD: CPT | Performed by: NURSE PRACTITIONER

## 2025-04-01 PROCEDURE — 1159F MED LIST DOCD IN RCRD: CPT | Performed by: NURSE PRACTITIONER

## 2025-04-01 PROCEDURE — 3078F DIAST BP <80 MM HG: CPT | Performed by: NURSE PRACTITIONER

## 2025-04-01 PROCEDURE — 99214 OFFICE O/P EST MOD 30 MIN: CPT | Performed by: NURSE PRACTITIONER

## 2025-04-01 RX ORDER — LETROZOLE 2.5 MG/1
2.5 TABLET, FILM COATED ORAL DAILY
COMMUNITY
Start: 2024-08-26

## 2025-04-01 NOTE — PROGRESS NOTES
[facility]     Outpatient Follow Up Note  Leanne Anthony RN, APRN,CNP   CHIEF COMPLAINT / HPI:  Follow Up    Juliana Reyes is 79 y.o. female who presents today with a history of LHC 11/25/24   Assessment normal coronary arteries with probable cold dominance.  Normal LV function.   Breast ca   LDL 90 no no known CAD      Interval history:  no c/o cp or SOB, wt up 21 lbs in 5 months/ has quan hose on with mild edema and no unusual SOB  / denies ROSALINE or PND / recommend to stop mobic if possible / try tylenol   Hr 60 sinus       Past Medical History:   Diagnosis Date    Asthma     Bradycardia     Cancer (HCC) 2024    BREAST    Essential hypertension, benign 06/13/2011    Extrinsic asthma 06/13/2011    GERD (gastroesophageal reflux disease)     EGD DONE    Glaucoma (increased eye pressure) 2015    Hyperlipidemia     Mild episode of recurrent major depressive disorder 12/21/2016    Mixed hyperlipidemia 05/10/2016    Reactive airway disease     Wears glasses      Social History:    Social History     Tobacco Use   Smoking Status Never   Smokeless Tobacco Never     Current Medications:  Current Outpatient Medications   Medication Sig Dispense Refill    letrozole (FEMARA) 2.5 MG tablet Take 1 tablet by mouth daily      meloxicam (MOBIC) 7.5 MG tablet TAKE 1 TABLET BY MOUTH DAILY AS NEEDED FOR PAIN 30 tablet 5    Elastic Bandages & Supports (MEDICAL COMPRESSION STOCKINGS) MISC Wear 30-40 mmHg pressure during the day. 1 each 0    losartan (COZAAR) 100 MG tablet TAKE 1 TABLET DAILY 90 tablet 3    ondansetron (ZOFRAN) 4 MG tablet Take 1 tablet by mouth 3 times daily as needed for Nausea or Vomiting 8 tablet 0    phytonadione (VITAMIN K) 5 MG tablet Take 1 tablet by mouth once      ipratropium (ATROVENT HFA) 17 MCG/ACT inhaler Inhale 1 puff into the lungs 3 times daily 12.9 g 3    montelukast (SINGULAIR) 10 MG tablet TAKE 1 TABLET DAILY 30 tablet 0    famotidine (PEPCID) 20 MG tablet TAKE 1 TABLET NIGHTLY 30 tablet 0

## 2025-04-04 ENCOUNTER — HOSPITAL ENCOUNTER (OUTPATIENT)
Dept: MAMMOGRAPHY | Age: 80
Discharge: HOME | End: 2025-04-04
Payer: MEDICARE

## 2025-04-04 VITALS — BODY MASS INDEX: 44.1 KG/M2 | WEIGHT: 281 LBS | HEIGHT: 67 IN

## 2025-04-04 DIAGNOSIS — C50.212 MALIGNANT NEOPLASM OF UPPER-INNER QUADRANT OF LEFT BREAST IN FEMALE, ESTROGEN RECEPTOR POSITIVE: ICD-10-CM

## 2025-04-04 DIAGNOSIS — Z17.0 MALIGNANT NEOPLASM OF UPPER-INNER QUADRANT OF LEFT BREAST IN FEMALE, ESTROGEN RECEPTOR POSITIVE: ICD-10-CM

## 2025-04-04 DIAGNOSIS — Z90.12 S/P PARTIAL MASTECTOMY, LEFT: ICD-10-CM

## 2025-04-04 PROCEDURE — G0279 TOMOSYNTHESIS, MAMMO: HCPCS

## 2025-04-16 ENCOUNTER — TELEPHONE (OUTPATIENT)
Dept: INTERNAL MEDICINE CLINIC | Age: 80
End: 2025-04-16

## 2025-04-16 ENCOUNTER — OFFICE VISIT (OUTPATIENT)
Dept: INTERNAL MEDICINE CLINIC | Age: 80
End: 2025-04-16
Payer: MEDICARE

## 2025-04-16 VITALS
OXYGEN SATURATION: 95 % | DIASTOLIC BLOOD PRESSURE: 62 MMHG | SYSTOLIC BLOOD PRESSURE: 122 MMHG | BODY MASS INDEX: 44.29 KG/M2 | WEIGHT: 282.2 LBS | HEART RATE: 52 BPM | HEIGHT: 67 IN

## 2025-04-16 DIAGNOSIS — E66.01 MORBID OBESITY (HCC): Chronic | ICD-10-CM

## 2025-04-16 DIAGNOSIS — Z00.00 MEDICARE ANNUAL WELLNESS VISIT, SUBSEQUENT: Primary | ICD-10-CM

## 2025-04-16 DIAGNOSIS — R00.1 BRADYCARDIA: ICD-10-CM

## 2025-04-16 DIAGNOSIS — J45.20 MILD INTERMITTENT EXTRINSIC ASTHMA WITHOUT COMPLICATION: ICD-10-CM

## 2025-04-16 DIAGNOSIS — I50.9 CHRONIC CONGESTIVE HEART FAILURE, UNSPECIFIED HEART FAILURE TYPE (HCC): ICD-10-CM

## 2025-04-16 DIAGNOSIS — I10 ESSENTIAL HYPERTENSION, BENIGN: ICD-10-CM

## 2025-04-16 DIAGNOSIS — R94.39 ABNORMAL CARDIOVASCULAR STRESS TEST: ICD-10-CM

## 2025-04-16 DIAGNOSIS — E78.2 MIXED HYPERLIPIDEMIA: Chronic | ICD-10-CM

## 2025-04-16 DIAGNOSIS — K21.00 GASTROESOPHAGEAL REFLUX DISEASE WITH ESOPHAGITIS WITHOUT HEMORRHAGE: Chronic | ICD-10-CM

## 2025-04-16 DIAGNOSIS — Z17.0 MALIGNANT NEOPLASM OF UPPER-INNER QUADRANT OF LEFT BREAST IN FEMALE, ESTROGEN RECEPTOR POSITIVE: ICD-10-CM

## 2025-04-16 DIAGNOSIS — C50.212 MALIGNANT NEOPLASM OF UPPER-INNER QUADRANT OF LEFT BREAST IN FEMALE, ESTROGEN RECEPTOR POSITIVE: ICD-10-CM

## 2025-04-16 LAB
25(OH)D3 SERPL-MCNC: 68.5 NG/ML
ALBUMIN SERPL-MCNC: 4.3 G/DL (ref 3.4–5)
ALBUMIN/GLOB SERPL: 1.6 {RATIO} (ref 1.1–2.2)
ALP SERPL-CCNC: 96 U/L (ref 40–129)
ALT SERPL-CCNC: 29 U/L (ref 10–40)
ANION GAP SERPL CALCULATED.3IONS-SCNC: 11 MMOL/L (ref 3–16)
AST SERPL-CCNC: 22 U/L (ref 15–37)
BILIRUB DIRECT SERPL-MCNC: 0.3 MG/DL (ref 0–0.3)
BILIRUB INDIRECT SERPL-MCNC: 0.3 MG/DL (ref 0–1)
BILIRUB SERPL-MCNC: 0.6 MG/DL (ref 0–1)
BUN SERPL-MCNC: 20 MG/DL (ref 7–20)
CALCIUM SERPL-MCNC: 10.1 MG/DL (ref 8.3–10.6)
CHLORIDE SERPL-SCNC: 102 MMOL/L (ref 99–110)
CHOLEST SERPL-MCNC: 188 MG/DL (ref 0–199)
CO2 SERPL-SCNC: 27 MMOL/L (ref 21–32)
CREAT SERPL-MCNC: 1 MG/DL (ref 0.6–1.2)
DEPRECATED RDW RBC AUTO: 13.9 % (ref 12.4–15.4)
EST. AVERAGE GLUCOSE BLD GHB EST-MCNC: 119.8 MG/DL
GFR SERPLBLD CREATININE-BSD FMLA CKD-EPI: 57 ML/MIN/{1.73_M2}
GLUCOSE SERPL-MCNC: 97 MG/DL (ref 70–99)
HBA1C MFR BLD: 5.8 %
HCT VFR BLD AUTO: 39.4 % (ref 36–48)
HDLC SERPL-MCNC: 85 MG/DL (ref 40–60)
HGB BLD-MCNC: 13 G/DL (ref 12–16)
LDLC SERPL CALC-MCNC: 88 MG/DL
MAGNESIUM SERPL-MCNC: 1.9 MG/DL (ref 1.8–2.4)
MCH RBC QN AUTO: 29.7 PG (ref 26–34)
MCHC RBC AUTO-ENTMCNC: 32.9 G/DL (ref 31–36)
MCV RBC AUTO: 90.4 FL (ref 80–100)
PLATELET # BLD AUTO: 249 K/UL (ref 135–450)
PMV BLD AUTO: 7.7 FL (ref 5–10.5)
POTASSIUM SERPL-SCNC: 4.1 MMOL/L (ref 3.5–5.1)
PROT SERPL-MCNC: 7 G/DL (ref 6.4–8.2)
RBC # BLD AUTO: 4.36 M/UL (ref 4–5.2)
SODIUM SERPL-SCNC: 140 MMOL/L (ref 136–145)
TRIGL SERPL-MCNC: 77 MG/DL (ref 0–150)
VLDLC SERPL CALC-MCNC: 15 MG/DL
WBC # BLD AUTO: 5.6 K/UL (ref 4–11)

## 2025-04-16 PROCEDURE — 1123F ACP DISCUSS/DSCN MKR DOCD: CPT | Performed by: INTERNAL MEDICINE

## 2025-04-16 PROCEDURE — 3078F DIAST BP <80 MM HG: CPT | Performed by: INTERNAL MEDICINE

## 2025-04-16 PROCEDURE — 1159F MED LIST DOCD IN RCRD: CPT | Performed by: INTERNAL MEDICINE

## 2025-04-16 PROCEDURE — G0439 PPPS, SUBSEQ VISIT: HCPCS | Performed by: INTERNAL MEDICINE

## 2025-04-16 PROCEDURE — 3074F SYST BP LT 130 MM HG: CPT | Performed by: INTERNAL MEDICINE

## 2025-04-16 RX ORDER — MOMETASONE FUROATE MONOHYDRATE 50 UG/1
2 SPRAY, METERED NASAL DAILY
Qty: 3 EACH | Refills: 1 | Status: SHIPPED | OUTPATIENT
Start: 2025-04-16

## 2025-04-16 SDOH — HEALTH STABILITY: PHYSICAL HEALTH: ON AVERAGE, HOW MANY DAYS PER WEEK DO YOU ENGAGE IN MODERATE TO STRENUOUS EXERCISE (LIKE A BRISK WALK)?: 0 DAYS

## 2025-04-16 SDOH — ECONOMIC STABILITY: FOOD INSECURITY: WITHIN THE PAST 12 MONTHS, YOU WORRIED THAT YOUR FOOD WOULD RUN OUT BEFORE YOU GOT MONEY TO BUY MORE.: NEVER TRUE

## 2025-04-16 SDOH — ECONOMIC STABILITY: FOOD INSECURITY: WITHIN THE PAST 12 MONTHS, THE FOOD YOU BOUGHT JUST DIDN'T LAST AND YOU DIDN'T HAVE MONEY TO GET MORE.: NEVER TRUE

## 2025-04-16 SDOH — HEALTH STABILITY: PHYSICAL HEALTH: ON AVERAGE, HOW MANY MINUTES DO YOU ENGAGE IN EXERCISE AT THIS LEVEL?: 0 MIN

## 2025-04-16 ASSESSMENT — PATIENT HEALTH QUESTIONNAIRE - PHQ9
SUM OF ALL RESPONSES TO PHQ QUESTIONS 1-9: 0
3. TROUBLE FALLING OR STAYING ASLEEP: NOT AT ALL
1. LITTLE INTEREST OR PLEASURE IN DOING THINGS: NOT AT ALL
SUM OF ALL RESPONSES TO PHQ QUESTIONS 1-9: 0
9. THOUGHTS THAT YOU WOULD BE BETTER OFF DEAD, OR OF HURTING YOURSELF: NOT AT ALL
SUM OF ALL RESPONSES TO PHQ QUESTIONS 1-9: 0
SUM OF ALL RESPONSES TO PHQ QUESTIONS 1-9: 0
7. TROUBLE CONCENTRATING ON THINGS, SUCH AS READING THE NEWSPAPER OR WATCHING TELEVISION: NOT AT ALL
SUM OF ALL RESPONSES TO PHQ QUESTIONS 1-9: 0
5. POOR APPETITE OR OVEREATING: NOT AT ALL
SUM OF ALL RESPONSES TO PHQ QUESTIONS 1-9: 0
8. MOVING OR SPEAKING SO SLOWLY THAT OTHER PEOPLE COULD HAVE NOTICED. OR THE OPPOSITE, BEING SO FIGETY OR RESTLESS THAT YOU HAVE BEEN MOVING AROUND A LOT MORE THAN USUAL: NOT AT ALL
10. IF YOU CHECKED OFF ANY PROBLEMS, HOW DIFFICULT HAVE THESE PROBLEMS MADE IT FOR YOU TO DO YOUR WORK, TAKE CARE OF THINGS AT HOME, OR GET ALONG WITH OTHER PEOPLE: NOT DIFFICULT AT ALL
SUM OF ALL RESPONSES TO PHQ QUESTIONS 1-9: 0
6. FEELING BAD ABOUT YOURSELF - OR THAT YOU ARE A FAILURE OR HAVE LET YOURSELF OR YOUR FAMILY DOWN: NOT AT ALL
4. FEELING TIRED OR HAVING LITTLE ENERGY: NOT AT ALL
SUM OF ALL RESPONSES TO PHQ QUESTIONS 1-9: 0
2. FEELING DOWN, DEPRESSED OR HOPELESS: NOT AT ALL

## 2025-04-16 ASSESSMENT — LIFESTYLE VARIABLES
HOW MANY STANDARD DRINKS CONTAINING ALCOHOL DO YOU HAVE ON A TYPICAL DAY: PATIENT DOES NOT DRINK
HOW OFTEN DO YOU HAVE A DRINK CONTAINING ALCOHOL: 1
HOW MANY STANDARD DRINKS CONTAINING ALCOHOL DO YOU HAVE ON A TYPICAL DAY: 0
HOW OFTEN DO YOU HAVE SIX OR MORE DRINKS ON ONE OCCASION: 1
HOW OFTEN DO YOU HAVE A DRINK CONTAINING ALCOHOL: NEVER

## 2025-04-16 NOTE — PATIENT INSTRUCTIONS
?? [facility] Article Start ??  Learn About Differences in Heart Failure Survival and Hospitalizations  Heart failure is when your heart has trouble pumping blood through your body. It makes you feel tired and out of breath easily. Heart failure happens more often and earlier in some groups of people. Research shows not everyone gets the same access to heart failure treatments. But there are things you can do to take care of your heart.  First, talk to your healthcare provider about your personal risk factors. These may include high blood pressure, extra weight, kidney problems, or others. Your healthcare provider can help create a plan just for you to manage those risks.  Next, ask your healthcare provider if you are getting the recommended treatments for your stage of heart failure. If not, find out why. Getting the right care at the right time is key to preventing your heart failure from getting worse.  You can take charge of your heart health too. Learn about how to manage heart failure. Use an demarcus to track your symptoms and medicines. Share any obstacles you have in getting care with your healthcare provider so you can find solutions together.  When it comes to your heart health, knowledge is power. Arm yourself with information and take active steps to avoid heart failure complications. Your heart is worth it!     Why This Is Important  Heart failure rates are increasing, especially for some groups. This shows heart failure is becoming more common and highlights the need to know your own risk.  Research shows the risk of hospitalization for heart failure is not the same for all people. Your risk of being hospitalized for heart failure may be much higher than others. Don't let the numbers scare you. Heart failure is preventable and treatable with proper care.  Studies show young Black adults are much more likely to die from heart failure. This means you may face greater barriers to preventing and

## 2025-04-16 NOTE — PROGRESS NOTES
Medicare Annual Wellness Visit    Juliana Reyes is here for Medicare AWV, Weight Management (Follow up ), and Home Visit (Wants a home health aid needs a little help around the house )    Assessment & Plan   Medicare annual wellness visit, subsequent  Malignant neoplasm of upper-inner quadrant of left breast in female, estrogen receptor positive  Comments:  She is taking Letrozole.  Chronic congestive heart failure, unspecified heart failure type (HCC)  Comments:  Chronic, stable.  Mixed hyperlipidemia  Comments:  Chronic, stable.  Essential hypertension, benign  Comments:  Chronic, stble.  Bradycardia  Comments:  Chronic, asymptomatic.  Mild intermittent extrinsic asthma without complication  -     mometasone (NASONEX) 50 MCG/ACT nasal spray; 2 sprays by Nasal route daily, Nasal, DAILY Starting Wed 4/16/2025, Disp-3 each, R-1, Normal       Return in about 6 months (around 10/16/2025).     Subjective   The following acute and/or chronic problems were also addressed today:  Breast cancer, obesity and hypertension    Patient's complete Health Risk Assessment and screening values have been reviewed and are found in Flowsheets. The following problems were reviewed today and where indicated follow up appointments were made and/or referrals ordered.    Positive Risk Factor Screenings with Interventions:              Inactivity:  On average, how many days per week do you engage in moderate to strenuous exercise (like a brisk walk)?: (Proxy-Rptd) 0 days (!) Abnormal  On average, how many minutes do you engage in exercise at this level?: (Proxy-Rptd) 0 min  Interventions:  Patient comments: She has frequent episodes of major depression  See [facility] for additional education material     Abnormal BMI (obese):  Body mass index is 44.2 kg/m². (!) Abnormal  Interventions:  Referred to Nutritionist/Dietician  Cooking class  See S for additional education material        Dentist Screen:  Have you seen the dentist within the past

## 2025-04-17 ENCOUNTER — RESULTS FOLLOW-UP (OUTPATIENT)
Dept: INTERNAL MEDICINE CLINIC | Age: 80
End: 2025-04-17

## 2025-04-17 ENCOUNTER — TELEPHONE (OUTPATIENT)
Dept: INTERNAL MEDICINE CLINIC | Age: 80
End: 2025-04-17

## 2025-04-17 NOTE — TELEPHONE ENCOUNTER
Patient was in office yesterday to see Dr Hilton.  Mely was supposed to get back with her.  Please contact patient regarding weight management.     Thank you

## 2025-04-17 NOTE — TELEPHONE ENCOUNTER
Spoke with Pt updated her on cooking class she is going to [facility] for alternative says the class may be out of her area

## 2025-04-18 NOTE — TELEPHONE ENCOUNTER
Patient called. Agreed to go to the cooking class.    Asks if it's possible,  to see if there is a cooking class available in [place]? States that it would be more convenient for her?  Please discuss with patient.

## 2025-04-18 NOTE — TELEPHONE ENCOUNTER
Spoke with pt re: cooking class. Pt agreeable to attend May 7 regardless of distance but is uncertain of additional session attendance given distance.    Pt was agreeable to all points of Wellness program.

## 2025-05-05 ENCOUNTER — PATIENT MESSAGE (OUTPATIENT)
Dept: INTERNAL MEDICINE CLINIC | Age: 80
End: 2025-05-05

## 2025-05-13 ENCOUNTER — NUTRITION (OUTPATIENT)
Dept: INTERNAL MEDICINE CLINIC | Age: 80
End: 2025-05-13

## 2025-05-15 NOTE — PROGRESS NOTES
NUTRITION THERAPY ASSESSMENT     Referring Physician: Luis Felipe Hilton  Referring diagnosis: Obesity     Juliana Reyes is a 79 y.o. female with morbid obesity who desires to lose weight so she can continue to care for her grandchildren. Patient was called for visit and RD spoke with pt over the telephone. Brief explained that she feels limited in movement and activity with so much extra weight. Patient attended the office sponsored cooking class last week with her son and enjoyed it. Patient stated she believes she may benefit from chair exercises and does not know where to start. Pt requested RD mail materials to patient with information regarding recommended diet and exercise for wt loss as well as heart health. RD agreed to mail materials and assisted pt in scheduling f/u appt to review materials and determine appropriate SMART goals. Pt requested f/u be a telephone call again as she has a difficult time getting to the office physically and per schedule as she cares for her grandchildren during the day.        OBJECTIVE DATA:  Past Medical History:   Diagnosis Date    Asthma     Bradycardia     Cancer (HCC) 2024    BREAST    Essential hypertension, benign 06/13/2011    Extrinsic asthma 06/13/2011    GERD (gastroesophageal reflux disease)     EGD DONE    Glaucoma (increased eye pressure) 2015    History of therapeutic radiation     Hyperlipidemia     Left 2024    Mild episode of recurrent major depressive disorder 12/21/2016    Mixed hyperlipidemia 05/10/2016    Reactive airway disease     Wears glasses      Labs:  Lab Results   Component Value Date/Time    TRIG 77 04/16/2025 12:15 PM    HDL 85 04/16/2025 12:15 PM    HDL 65 02/16/2012 08:10 AM     Lab Results   Component Value Date/Time    LABA1C 5.8 04/16/2025 12:15 PM       Medications:   Letrozole (oral chemo r/t breast cancer)    Anthropometric Measures:   Height:   5' 7\"  Current Weight:   282 lb/ 128 kg    Ideal Body Weight: 135 lb/ 61 kg    There is no height

## 2025-06-27 DIAGNOSIS — I10 ESSENTIAL HYPERTENSION, BENIGN: ICD-10-CM

## 2025-06-27 RX ORDER — ATORVASTATIN CALCIUM 20 MG/1
20 TABLET, FILM COATED ORAL DAILY
Qty: 90 TABLET | Refills: 1 | Status: SHIPPED | OUTPATIENT
Start: 2025-06-27

## 2025-06-27 RX ORDER — AMLODIPINE BESYLATE 10 MG/1
10 TABLET ORAL DAILY
Qty: 90 TABLET | Refills: 1 | Status: SHIPPED | OUTPATIENT
Start: 2025-06-27

## 2025-06-27 RX ORDER — TRIAMTERENE AND HYDROCHLOROTHIAZIDE 37.5; 25 MG/1; MG/1
1 TABLET ORAL DAILY
Qty: 90 TABLET | Refills: 1 | Status: SHIPPED | OUTPATIENT
Start: 2025-06-27

## 2025-06-27 NOTE — TELEPHONE ENCOUNTER
Recent Visits  Date Type Provider Dept   04/16/25 Office Visit Luis Felipe Hilton MD [facility]   10/16/24 Office Visit Luis Felipe Hilton MD [facility]   08/19/24 Office Visit Luis Felipe Hilton MD [facility]   Showing recent visits within past 540 days with a meds authorizing provider and meeting all other requirements  Future Appointments  Date Type Provider Dept   10/17/25 Appointment Luis Felipe Hilton MD [facility]   Showing future appointments within next 150 days with a meds authorizing provider and meeting all other requirements     4/16/2025

## 2025-07-23 ENCOUNTER — TRANSCRIBE ORDERS (OUTPATIENT)
Dept: ADMINISTRATIVE | Age: 80
End: 2025-07-23

## 2025-07-23 DIAGNOSIS — C50.212 MALIGNANT NEOPLASM OF UPPER-INNER QUADRANT OF LEFT FEMALE BREAST, UNSPECIFIED ESTROGEN RECEPTOR STATUS (HCC): Primary | ICD-10-CM

## 2025-08-19 ENCOUNTER — NUTRITION (OUTPATIENT)
Dept: INTERNAL MEDICINE CLINIC | Age: 80
End: 2025-08-19

## (undated) DEVICE — GUIDEWIRE VASC L260CM DIA0.035IN RAD 3MM J TIP L7CM PTFE

## (undated) DEVICE — SHEET,DRAPE,40X58,STERILE: Brand: MEDLINE

## (undated) DEVICE — MINOR BREAST: Brand: MEDLINE INDUSTRIES, INC.

## (undated) DEVICE — PAD, DEFIB, ADULT, RADIOTRANS, PHYSIO: Brand: MEDLINE

## (undated) DEVICE — BLADE ES ELASTOMERIC COAT INSUL DURABLE BEND UPTO 90DEG

## (undated) DEVICE — TR BAND RADIAL ARTERY COMPRESSION DEVICE: Brand: TR BAND

## (undated) DEVICE — PROBE SET W/DRAPE

## (undated) DEVICE — SUTURE MONOCRYL + SZ 4-0 L27IN ABSRB UD L19MM PS-2 3/8 CIR MCP426H

## (undated) DEVICE — GLOVE SURG SZ 7 L12IN FNGR THK79MIL GRN LTX FREE

## (undated) DEVICE — CATH LAB PACK: Brand: MEDLINE INDUSTRIES, INC.

## (undated) DEVICE — GLOVE ORANGE PI 7   MSG9070

## (undated) DEVICE — YANKAUER,OPEN TIP,W/O VENT,STERILE: Brand: MEDLINE INDUSTRIES, INC.

## (undated) DEVICE — GLIDESHEATH SLENDER STAINLESS STEEL KIT: Brand: GLIDESHEATH SLENDER

## (undated) DEVICE — SUTURE VICRYL + SZ 3-0 L18IN ABSRB UD SH 1/2 CIR TAPERCUT NDL VCP864D

## (undated) DEVICE — GOWN,SIRUS,POLYRNF,BRTHSLV,XLN/XL,20/CS: Brand: MEDLINE

## (undated) DEVICE — NEPTUNE E-SEP SMOKE EVACUATION PENCIL, COATED, 70MM BLADE, PUSH BUTTON SWITCH: Brand: NEPTUNE E-SEP

## (undated) DEVICE — TRAP FLUID

## (undated) DEVICE — SUTURE VICRYL + SZ 3-0 L27IN ABSRB UD L26MM SH 1/2 CIR VCP416H

## (undated) DEVICE — TOWEL,STOP FLAG GOLD N-W: Brand: MEDLINE

## (undated) DEVICE — CATHETER DIAG 5FR L100CM LUMN ID0.047IN JL3.5 CRV 0 SIDE H

## (undated) DEVICE — LIQUIBAND RAPID ADHESIVE 36/CS 0.8ML: Brand: MEDLINE

## (undated) DEVICE — INTENDED USE FOR SURGICAL MARKING ON INTACT SKIN, ALSO PROVIDES A PERMANENT METHOD OF IDENTIFYING OBJECTS IN THE OPERATING ROOM: Brand: WRITESITE® PLUS MINI PREP RESISTANT MARKER

## (undated) DEVICE — APPLICATOR MEDICATED 26 CC SOLUTION HI LT ORNG CHLORAPREP

## (undated) DEVICE — DRAPE,T,LAPARO,TRANS,STERILE: Brand: MEDLINE

## (undated) DEVICE — CATHETER DIAG 5FR L100CM LUMN ID0.047IN JR4 CRV 0 SIDE H

## (undated) DEVICE — COVIDIEN WHOLEY 145CM GUIDE WIRE